# Patient Record
Sex: MALE | Race: WHITE | NOT HISPANIC OR LATINO | Employment: OTHER | URBAN - METROPOLITAN AREA
[De-identification: names, ages, dates, MRNs, and addresses within clinical notes are randomized per-mention and may not be internally consistent; named-entity substitution may affect disease eponyms.]

---

## 2018-05-31 ENCOUNTER — HOSPITAL ENCOUNTER (OUTPATIENT)
Dept: ULTRASOUND IMAGING | Facility: CLINIC | Age: 68
End: 2018-05-31
Attending: FAMILY MEDICINE
Payer: MEDICARE

## 2018-05-31 ENCOUNTER — HOSPITAL ENCOUNTER (OUTPATIENT)
Dept: ULTRASOUND IMAGING | Facility: CLINIC | Age: 68
Discharge: HOME | End: 2018-05-31
Attending: FAMILY MEDICINE | Admitting: FAMILY MEDICINE
Payer: MEDICARE

## 2018-05-31 DIAGNOSIS — L03.115 CELLULITIS OF RIGHT LEG: ICD-10-CM

## 2018-05-31 DIAGNOSIS — R60.0 LOCALIZED EDEMA: ICD-10-CM

## 2018-05-31 DIAGNOSIS — R09.89 DIMINISHED PULSES IN LOWER EXTREMITY: ICD-10-CM

## 2018-05-31 PROCEDURE — 93971 EXTREMITY STUDY: CPT | Mod: RT

## 2018-05-31 PROCEDURE — 93922 UPR/L XTREMITY ART 2 LEVELS: CPT

## 2019-02-23 ENCOUNTER — HOSPITAL ENCOUNTER (OUTPATIENT)
Facility: CLINIC | Age: 69
Setting detail: OBSERVATION
Discharge: HOME | End: 2019-02-24
Attending: FAMILY MEDICINE | Admitting: INTERNAL MEDICINE
Payer: MEDICARE

## 2019-02-23 ENCOUNTER — APPOINTMENT (OUTPATIENT)
Dept: GENERAL RADIOLOGY | Facility: CLINIC | Age: 69
End: 2019-02-23
Attending: FAMILY MEDICINE
Payer: MEDICARE

## 2019-02-23 DIAGNOSIS — J11.1 INFLUENZA-LIKE SYNDROME: ICD-10-CM

## 2019-02-23 DIAGNOSIS — I95.9 HYPOTENSION, UNSPECIFIED HYPOTENSION TYPE: ICD-10-CM

## 2019-02-23 DIAGNOSIS — J11.1 INFLUENZA-LIKE ILLNESS: ICD-10-CM

## 2019-02-23 DIAGNOSIS — Z79.899 LONG TERM USE OF DRUG: ICD-10-CM

## 2019-02-23 DIAGNOSIS — F17.210 CIGARETTE SMOKER: ICD-10-CM

## 2019-02-23 DIAGNOSIS — D75.89 MACROCYTOSIS WITHOUT ANEMIA: ICD-10-CM

## 2019-02-23 LAB
ALBUMIN SERPL-MCNC: 3.5 G/DL (ref 3.4–5)
ALBUMIN UR-MCNC: NEGATIVE MG/DL
ALP SERPL-CCNC: 51 U/L (ref 40–150)
ALT SERPL W P-5'-P-CCNC: 24 U/L (ref 0–70)
ANION GAP SERPL CALCULATED.3IONS-SCNC: 5 MMOL/L (ref 3–14)
APPEARANCE UR: CLEAR
AST SERPL W P-5'-P-CCNC: 28 U/L (ref 0–45)
BASOPHILS # BLD AUTO: 0.1 10E9/L (ref 0–0.2)
BASOPHILS NFR BLD AUTO: 1.8 %
BILIRUB SERPL-MCNC: 0.2 MG/DL (ref 0.2–1.3)
BILIRUB UR QL STRIP: NEGATIVE
BUN SERPL-MCNC: 21 MG/DL (ref 7–30)
CALCIUM SERPL-MCNC: 8.2 MG/DL (ref 8.5–10.1)
CHLORIDE SERPL-SCNC: 100 MMOL/L (ref 94–109)
CO2 SERPL-SCNC: 28 MMOL/L (ref 20–32)
COLOR UR AUTO: YELLOW
CREAT SERPL-MCNC: 1.24 MG/DL (ref 0.66–1.25)
DIFFERENTIAL METHOD BLD: ABNORMAL
EOSINOPHIL # BLD AUTO: 0.1 10E9/L (ref 0–0.7)
EOSINOPHIL NFR BLD AUTO: 2.4 %
ERYTHROCYTE [DISTWIDTH] IN BLOOD BY AUTOMATED COUNT: 13.1 % (ref 10–15)
ETHANOL SERPL-MCNC: <0.01 G/DL
FLUAV+FLUBV AG SPEC QL: NEGATIVE
FLUAV+FLUBV AG SPEC QL: NEGATIVE
FLUAV+FLUBV RNA SPEC QL NAA+PROBE: NEGATIVE
FLUAV+FLUBV RNA SPEC QL NAA+PROBE: NEGATIVE
GFR SERPL CREATININE-BSD FRML MDRD: 59 ML/MIN/{1.73_M2}
GLUCOSE SERPL-MCNC: 119 MG/DL (ref 70–99)
GLUCOSE UR STRIP-MCNC: NEGATIVE MG/DL
HCT VFR BLD AUTO: 42.6 % (ref 40–53)
HEMOCCULT STL QL: NEGATIVE
HGB BLD-MCNC: 13.6 G/DL (ref 13.3–17.7)
HGB UR QL STRIP: NEGATIVE
IMM GRANULOCYTES # BLD: 0 10E9/L (ref 0–0.4)
IMM GRANULOCYTES NFR BLD: 0.6 %
INR PPP: 0.94 (ref 0.86–1.14)
INTERNAL QC OK POCT: YES
KETONES UR STRIP-MCNC: NEGATIVE MG/DL
LACTATE BLD-SCNC: 1.9 MMOL/L (ref 0.7–2)
LEUKOCYTE ESTERASE UR QL STRIP: NEGATIVE
LYMPHOCYTES # BLD AUTO: 1.1 10E9/L (ref 0.8–5.3)
LYMPHOCYTES NFR BLD AUTO: 22.4 %
MCH RBC QN AUTO: 34.1 PG (ref 26.5–33)
MCHC RBC AUTO-ENTMCNC: 31.9 G/DL (ref 31.5–36.5)
MCV RBC AUTO: 107 FL (ref 78–100)
MONOCYTES # BLD AUTO: 0.6 10E9/L (ref 0–1.3)
MONOCYTES NFR BLD AUTO: 11.7 %
NEUTROPHILS # BLD AUTO: 3 10E9/L (ref 1.6–8.3)
NEUTROPHILS NFR BLD AUTO: 61.1 %
NITRATE UR QL: NEGATIVE
NRBC # BLD AUTO: 0 10*3/UL
NRBC BLD AUTO-RTO: 0 /100
NT-PROBNP SERPL-MCNC: 95 PG/ML (ref 0–900)
PH UR STRIP: 5 PH (ref 5–7)
PLATELET # BLD AUTO: 199 10E9/L (ref 150–450)
POTASSIUM SERPL-SCNC: 3.6 MMOL/L (ref 3.4–5.3)
PROT SERPL-MCNC: 7.4 G/DL (ref 6.8–8.8)
RBC # BLD AUTO: 3.99 10E12/L (ref 4.4–5.9)
RETICS # AUTO: 36.4 10E9/L (ref 25–95)
RETICS/RBC NFR AUTO: 0.9 % (ref 0.5–2)
RSV RNA SPEC NAA+PROBE: NEGATIVE
SODIUM SERPL-SCNC: 133 MMOL/L (ref 133–144)
SOURCE: NORMAL
SP GR UR STRIP: 1.01 (ref 1–1.03)
SPECIMEN SOURCE: NORMAL
SPECIMEN SOURCE: NORMAL
TEST CARD LOT NUMBER: NORMAL
TROPONIN I SERPL-MCNC: <0.015 UG/L (ref 0–0.04)
TSH SERPL DL<=0.005 MIU/L-ACNC: 3.97 MU/L (ref 0.4–4)
UROBILINOGEN UR STRIP-MCNC: 0 MG/DL (ref 0–2)
WBC # BLD AUTO: 5 10E9/L (ref 4–11)

## 2019-02-23 PROCEDURE — 99220 ZZC INITIAL OBSERVATION CARE,LEVL III: CPT | Performed by: INTERNAL MEDICINE

## 2019-02-23 PROCEDURE — 99285 EMERGENCY DEPT VISIT HI MDM: CPT | Mod: 25 | Performed by: FAMILY MEDICINE

## 2019-02-23 PROCEDURE — 80320 DRUG SCREEN QUANTALCOHOLS: CPT | Performed by: FAMILY MEDICINE

## 2019-02-23 PROCEDURE — 99291 CRITICAL CARE FIRST HOUR: CPT | Mod: 25 | Performed by: FAMILY MEDICINE

## 2019-02-23 PROCEDURE — 85025 COMPLETE CBC W/AUTO DIFF WBC: CPT | Performed by: FAMILY MEDICINE

## 2019-02-23 PROCEDURE — 87040 BLOOD CULTURE FOR BACTERIA: CPT | Mod: 91 | Performed by: FAMILY MEDICINE

## 2019-02-23 PROCEDURE — 84484 ASSAY OF TROPONIN QUANT: CPT | Performed by: FAMILY MEDICINE

## 2019-02-23 PROCEDURE — 85045 AUTOMATED RETICULOCYTE COUNT: CPT | Performed by: INTERNAL MEDICINE

## 2019-02-23 PROCEDURE — 93010 ELECTROCARDIOGRAM REPORT: CPT | Mod: Z6 | Performed by: FAMILY MEDICINE

## 2019-02-23 PROCEDURE — 87804 INFLUENZA ASSAY W/OPTIC: CPT | Mod: 91 | Performed by: FAMILY MEDICINE

## 2019-02-23 PROCEDURE — 25800030 ZZH RX IP 258 OP 636: Performed by: FAMILY MEDICINE

## 2019-02-23 PROCEDURE — 25000132 ZZH RX MED GY IP 250 OP 250 PS 637: Mod: GY | Performed by: FAMILY MEDICINE

## 2019-02-23 PROCEDURE — 93005 ELECTROCARDIOGRAM TRACING: CPT | Performed by: FAMILY MEDICINE

## 2019-02-23 PROCEDURE — 25000132 ZZH RX MED GY IP 250 OP 250 PS 637: Mod: GY | Performed by: INTERNAL MEDICINE

## 2019-02-23 PROCEDURE — 83605 ASSAY OF LACTIC ACID: CPT | Performed by: FAMILY MEDICINE

## 2019-02-23 PROCEDURE — 82272 OCCULT BLD FECES 1-3 TESTS: CPT | Performed by: FAMILY MEDICINE

## 2019-02-23 PROCEDURE — 84443 ASSAY THYROID STIM HORMONE: CPT | Performed by: INTERNAL MEDICINE

## 2019-02-23 PROCEDURE — 96360 HYDRATION IV INFUSION INIT: CPT | Performed by: FAMILY MEDICINE

## 2019-02-23 PROCEDURE — 87086 URINE CULTURE/COLONY COUNT: CPT | Performed by: FAMILY MEDICINE

## 2019-02-23 PROCEDURE — 85610 PROTHROMBIN TIME: CPT | Performed by: FAMILY MEDICINE

## 2019-02-23 PROCEDURE — 87631 RESP VIRUS 3-5 TARGETS: CPT | Performed by: FAMILY MEDICINE

## 2019-02-23 PROCEDURE — G0378 HOSPITAL OBSERVATION PER HR: HCPCS

## 2019-02-23 PROCEDURE — 80053 COMPREHEN METABOLIC PANEL: CPT | Performed by: FAMILY MEDICINE

## 2019-02-23 PROCEDURE — A9270 NON-COVERED ITEM OR SERVICE: HCPCS | Mod: GY | Performed by: FAMILY MEDICINE

## 2019-02-23 PROCEDURE — 83880 ASSAY OF NATRIURETIC PEPTIDE: CPT | Performed by: FAMILY MEDICINE

## 2019-02-23 PROCEDURE — 82607 VITAMIN B-12: CPT | Performed by: INTERNAL MEDICINE

## 2019-02-23 PROCEDURE — 81003 URINALYSIS AUTO W/O SCOPE: CPT | Performed by: FAMILY MEDICINE

## 2019-02-23 PROCEDURE — 71046 X-RAY EXAM CHEST 2 VIEWS: CPT

## 2019-02-23 PROCEDURE — A9270 NON-COVERED ITEM OR SERVICE: HCPCS | Mod: GY | Performed by: INTERNAL MEDICINE

## 2019-02-23 PROCEDURE — 96361 HYDRATE IV INFUSION ADD-ON: CPT | Performed by: FAMILY MEDICINE

## 2019-02-23 RX ORDER — VENLAFAXINE 100 MG/1
200 TABLET ORAL EVERY MORNING
COMMUNITY

## 2019-02-23 RX ORDER — ONDANSETRON 4 MG/1
4 TABLET, ORALLY DISINTEGRATING ORAL EVERY 6 HOURS PRN
Status: DISCONTINUED | OUTPATIENT
Start: 2019-02-23 | End: 2019-02-24 | Stop reason: HOSPADM

## 2019-02-23 RX ORDER — DEXTROSE MONOHYDRATE, SODIUM CHLORIDE, AND POTASSIUM CHLORIDE 50; 1.49; 4.5 G/1000ML; G/1000ML; G/1000ML
INJECTION, SOLUTION INTRAVENOUS CONTINUOUS
Status: DISCONTINUED | OUTPATIENT
Start: 2019-02-23 | End: 2019-02-23

## 2019-02-23 RX ORDER — ACETAMINOPHEN 650 MG/1
650 SUPPOSITORY RECTAL EVERY 4 HOURS PRN
Status: DISCONTINUED | OUTPATIENT
Start: 2019-02-23 | End: 2019-02-24 | Stop reason: HOSPADM

## 2019-02-23 RX ORDER — ONDANSETRON 2 MG/ML
4 INJECTION INTRAMUSCULAR; INTRAVENOUS EVERY 6 HOURS PRN
Status: DISCONTINUED | OUTPATIENT
Start: 2019-02-23 | End: 2019-02-24 | Stop reason: HOSPADM

## 2019-02-23 RX ORDER — NALOXONE HYDROCHLORIDE 0.4 MG/ML
.1-.4 INJECTION, SOLUTION INTRAMUSCULAR; INTRAVENOUS; SUBCUTANEOUS
Status: DISCONTINUED | OUTPATIENT
Start: 2019-02-23 | End: 2019-02-24 | Stop reason: HOSPADM

## 2019-02-23 RX ORDER — ACETAMINOPHEN 325 MG/1
650 TABLET ORAL EVERY 4 HOURS PRN
Status: DISCONTINUED | OUTPATIENT
Start: 2019-02-23 | End: 2019-02-24 | Stop reason: HOSPADM

## 2019-02-23 RX ORDER — NICOTINE 21 MG/24HR
1 PATCH, TRANSDERMAL 24 HOURS TRANSDERMAL DAILY
Status: DISCONTINUED | OUTPATIENT
Start: 2019-02-23 | End: 2019-02-24 | Stop reason: HOSPADM

## 2019-02-23 RX ORDER — BENZOCAINE/MENTHOL 6 MG-10 MG
LOZENGE MUCOUS MEMBRANE 2 TIMES DAILY
Status: DISCONTINUED | OUTPATIENT
Start: 2019-02-23 | End: 2019-02-24 | Stop reason: HOSPADM

## 2019-02-23 RX ORDER — OSELTAMIVIR PHOSPHATE 75 MG/1
75 CAPSULE ORAL 2 TIMES DAILY
Status: DISCONTINUED | OUTPATIENT
Start: 2019-02-23 | End: 2019-02-24

## 2019-02-23 RX ORDER — VENLAFAXINE 100 MG/1
200 TABLET ORAL EVERY MORNING
Status: DISCONTINUED | OUTPATIENT
Start: 2019-02-24 | End: 2019-02-24 | Stop reason: HOSPADM

## 2019-02-23 RX ADMIN — HYDROCORTISONE: 10 CREAM TOPICAL at 20:45

## 2019-02-23 RX ADMIN — SODIUM CHLORIDE, POTASSIUM CHLORIDE, SODIUM LACTATE AND CALCIUM CHLORIDE 1000 ML: 600; 310; 30; 20 INJECTION, SOLUTION INTRAVENOUS at 15:24

## 2019-02-23 RX ADMIN — POTASSIUM CHLORIDE, DEXTROSE MONOHYDRATE AND SODIUM CHLORIDE: 150; 5; 450 INJECTION, SOLUTION INTRAVENOUS at 19:37

## 2019-02-23 RX ADMIN — CARBAMIDE PEROXIDE 6.5% 3 DROP: 6.5 LIQUID AURICULAR (OTIC) at 20:45

## 2019-02-23 RX ADMIN — OSELTAMIVIR PHOSPHATE 75 MG: 75 CAPSULE ORAL at 15:31

## 2019-02-23 RX ADMIN — SODIUM CHLORIDE, POTASSIUM CHLORIDE, SODIUM LACTATE AND CALCIUM CHLORIDE 1000 ML: 600; 310; 30; 20 INJECTION, SOLUTION INTRAVENOUS at 14:27

## 2019-02-23 RX ADMIN — NICOTINE 1 PATCH: 21 PATCH, EXTENDED RELEASE TRANSDERMAL at 20:44

## 2019-02-23 ASSESSMENT — MIFFLIN-ST. JEOR
SCORE: 1455.51
SCORE: 1450.25

## 2019-02-23 NOTE — ED NOTES
"Cough for past 3 days, chills. Diarrhea x3 today. Denies fever. Appetite \"ok.\"   Smoker pack a day.   "

## 2019-02-23 NOTE — ED NOTES
Patient has  [place] to Observation  order. Patient has been given Observation brochure and  What does Observation mean to me  form.        Tita Menjivar

## 2019-02-23 NOTE — ED PROVIDER NOTES
"  History     Chief Complaint   Patient presents with     Hypotension     body aches-possible fever-weak and light headed and cough     HPI    Julissa Almazan is a 68 year old male with a history of hypertension, and chronic airway obstruction who presents to the emergency department today for evaluation of hypotension. Patient developed flu like symptoms three days ago that included cough, fever, chills, myalgias, and fatigue. Patient notes increased urinary frequency on the first day that has since stopped, and three episodes of diarrhea today. He denies any shortness of breath, dyspnea, abdominal pain, chest pain, dysuria, black tarry stoles, or blood in his stool. Patient smokes a pack a day and drinks 3 or 4 beers a day. He denies drinking anything today. His daily medications include lisinopril and venlafaxine.  He says he is not taking the other medications listed below.  He does not believe he drinks excessively.    Records through Care Everywhere are as follows:  \"Allergies  No Known Allergies  Current Medications  Reconcile with Patient's Chart    Prescription Sig. Disp. Refills Start Date End Date Status   aspirin enteric coated 81 mg tablet   Take 1 tablet by mouth once daily with a meal.   0 04/08/2011   Active   cyclobenzaprine (FLEXERIL) 10 mg tablet    Indications: Lumbar disc disease TAKE 1 TABLET BY MOUTH 3 TIMES DAILY IF NEEDED FOR MUSCLE SPASM 60 tablet   2 01/26/2017   Active   diclofenac (VOLTAREN) 75 mg delayed-release tablet    Indications: Lumbar disc disease Take 1 tablet by mouth 2 times daily if needed for Other (Specify). 60 tablet   2 01/24/2018   Active   sildenafil citrate (VIAGRA) 100 mg tablet    Indications: Erectile dysfunction due to arterial insufficiency Take 1 tablet by mouth once daily if needed for Erectile Dysfunction. Take 30min to 4 hours before sexual activity. Max 100mg/24hr. 10 tablet   11 01/24/2018   Active   venlafaxine (EFFEXOR) 100 mg tablet    Indications: " Adjustment disorder with depressed mood Take 2 tablets by mouth every morning. 180 tablet   3 2018   Active   lisinopril (PRINIVIL; ZESTRIL) 20 mg tablet    Indications: Hypertension Take 1 tablet by mouth once daily. 90 tablet   3 2018   Active   terbinafine HCl (LAMISIL) 250 mg tablet    Indications: Fungal infection of skin Take 1 tablet by mouth once daily. 14 tablet   0 2018   Active   varenicline (CHANTIX DOSEPAK) 0.5 mg (11)- 1 mg (42) tablet    Indications: Tobacco use disorder Days 1-3 take 0.5mg once daily; Days 4-7 take 0.5mg twice daily; then increase to 1mg twice daily. Take with meals. 1 Packet   0 2018   Active   varenicline (CHANTIX) 1 mg tablet    Indications: Tobacco use disorder Take 1 mg by mouth 2 times daily with meals. 112 tablet   0 2018   Active   triamcinolone (ARISTOCORT; KENALOG) 0.1 % cream    Indications: Dermatitis Apply topically to affected area(s) 3 times daily. 80 g   0 2018   Active     Active Problems  Reconcile with Patient's Chart    Problem Noted Date   Hypertension 2011   Lumbar disc disease 2011   Overview:       7/15/2005 MRI Lumbar Spine. Broad based posterior disc protrusion at L4-5 is unchanged. Right for lateral disc protrusion at L4-5 abutting the right L4 is new. Mild degenerative retrolisthesis of L2 on L3 and L3 on L4 with shallow posterior disc protrusion at these levels are unchanged.  2005 X-ray No compression fracture. 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       Past Medical History:    Past Medical History:   Diagnosis Date     Hypertension        Past Surgical History:    Past Surgical History:   Procedure Laterality Date     BACK SURGERY       COLONOSCOPY  11/21/2013    Procedure: COLONOSCOPY;  Colonoscopy  ;  Surgeon: Quincy Meredith MD;  Location: [facility]       Family History:    No family history on file.    Social History:  Marital Status:   [4]  Social History     Tobacco Use     Smoking status: Current Every Day Smoker     Packs/day: 1.00     Types: Cigarettes   Substance Use Topics     Alcohol use: Yes     Comment: 2 daily     Drug use: No        Medications:      aspirin (ASA) 81 MG tablet   LISINOPRIL PO   Pseudoephedrine-DM-GG-APAP (DAYQUIL LIQUICAPS OR)   venlafaxine (EFFEXOR) 100 MG tablet   diclofenac (VOLTAREN) 75 MG EC tablet   sildenafil (VIAGRA) 100 MG tablet     Review of Systems    All other systems are reviewed and are negative    Physical Exam   BP: (!) 67/49  Pulse: 66  Heart Rate: 76  Temp: 97.6  F (36.4  C)  Resp: 18  Height: 179.1 cm (5' 10.5\")  Weight: 67.1 kg (148 lb)  SpO2: 97 %      Physical Exam    Nursing note and vitals were reviewed.  Constitutional: Awake and alert, poorly nourished and chronically ill appearing 68-year-old in no apparent discomfort, who does not appear acutely ill, and who answers questions appropriately and cooperates with examination.  HEENT: EACs clear.  TMs normal.  Oropharynx shows poor dentition is otherwise unremarkable EOMI.   Neck: Freely mobile.  No adenopathy or masses.  No meningismus.  Cardiovascular: Cardiac examination reveals normal heart rate and regular rhythm without murmur.  Pulmonary/Chest: Breathing is unlabored.  Breath sounds are clear and equal bilaterally.  There no retractions, tachypnea, rales, wheezes, or rhonchi.  Abdomen: Soft, nontender, no HSM or masses rebound or guarding.  Musculoskeletal: Extremities are " warm and well-perfused and without edema  Neurological: Alert, oriented, thought content logical, coherent   Skin: Warm, dry, no rashes.  Psychiatric: Affect broad and appropriate.    ED Course   2:14 Patient assessed. Course of care outlined.      Procedures               EKG Interpretation:      Interpreted by Scott Castle  Time reviewed: 14:27  Symptoms at time of EKG: weakness; hypotension   Rhythm: normal sinus   Rate: normal  Axis: normal  Ectopy: none  Conduction: normal  ST Segments/ T Waves: ST elevation in V2 and V3 consistent with early repolarization  Q Waves: none  Comparison to prior: No old EKG available    Clinical Impression: Early repolarization, otherwise normal EKG    Critical Care time:  was 30 minutes for this patient excluding procedures.               Results for orders placed or performed during the hospital encounter of 02/23/19 (from the past 24 hour(s))   Occult blood stool POCT   Result Value Ref Range    Occult Blood NEGATIVE neg    Internal QC OK Yes     Test Card Lot Number [id number] 08-21    CBC with platelets differential   Result Value Ref Range    WBC 5.0 4.0 - 11.0 10e9/L    RBC Count 3.99 (L) 4.4 - 5.9 10e12/L    Hemoglobin 13.6 13.3 - 17.7 g/dL    Hematocrit 42.6 40.0 - 53.0 %     (H) 78 - 100 fl    MCH 34.1 (H) 26.5 - 33.0 pg    MCHC 31.9 31.5 - 36.5 g/dL    RDW 13.1 10.0 - 15.0 %    Platelet Count 199 150 - 450 10e9/L    Diff Method Automated Method     % Neutrophils 61.1 %    % Lymphocytes 22.4 %    % Monocytes 11.7 %    % Eosinophils 2.4 %    % Basophils 1.8 %    % Immature Granulocytes 0.6 %    Nucleated RBCs 0 0 /100    Absolute Neutrophil 3.0 1.6 - 8.3 10e9/L    Absolute Lymphocytes 1.1 0.8 - 5.3 10e9/L    Absolute Monocytes 0.6 0.0 - 1.3 10e9/L    Absolute Eosinophils 0.1 0.0 - 0.7 10e9/L    Absolute Basophils 0.1 0.0 - 0.2 10e9/L    Abs Immature Granulocytes 0.0 0 - 0.4 10e9/L    Absolute Nucleated RBC 0.0    INR   Result Value Ref Range    INR 0.94 0.86 - 1.14    Comprehensive metabolic panel   Result Value Ref Range    Sodium 133 133 - 144 mmol/L    Potassium 3.6 3.4 - 5.3 mmol/L    Chloride 100 94 - 109 mmol/L    Carbon Dioxide 28 20 - 32 mmol/L    Anion Gap 5 3 - 14 mmol/L    Glucose 119 (H) 70 - 99 mg/dL    Urea Nitrogen 21 7 - 30 mg/dL    Creatinine 1.24 0.66 - 1.25 mg/dL    GFR Estimate 59 (L) >60 mL/min/[1.73_m2]    GFR Estimate If Black 68 >60 mL/min/[1.73_m2]    Calcium 8.2 (L) 8.5 - 10.1 mg/dL    Bilirubin Total 0.2 0.2 - 1.3 mg/dL    Albumin 3.5 3.4 - 5.0 g/dL    Protein Total 7.4 6.8 - 8.8 g/dL    Alkaline Phosphatase 51 40 - 150 U/L    ALT 24 0 - 70 U/L    AST 28 0 - 45 U/L   Lactic acid whole blood   Result Value Ref Range    Lactic Acid 1.9 0.7 - 2.0 mmol/L   Troponin I   Result Value Ref Range    Troponin I ES <0.015 0.000 - 0.045 ug/L   Nt probnp inpatient (BNP)   Result Value Ref Range    N-Terminal Pro BNP Inpatient 95 0 - 900 pg/mL   Blood culture   Result Value Ref Range    Specimen Description Blood     Culture Micro No growth after 1 hour    Alcohol ethyl   Result Value Ref Range    Ethanol g/dL <0.01 <0.01 g/dL   Influenza A/B antigen   Result Value Ref Range    Influenza A/B Agn Specimen Nasopharyngeal     Influenza A Negative NEG^Negative    Influenza B Negative NEG^Negative   XR Chest 2 Views    Narrative    CHEST TWO VIEWS 2/23/2019 2:48 PM     HISTORY: Cough, fever.     COMPARISON: None available    FINDINGS: Slight aortic tortuosity.      Impression    IMPRESSION:   No acute consolidation.   UA reflex to Microscopic   Result Value Ref Range    Color Urine Yellow     Appearance Urine Clear     Glucose Urine Negative NEG^Negative mg/dL    Bilirubin Urine Negative NEG^Negative    Ketones Urine Negative NEG^Negative mg/dL    Specific Gravity Urine 1.012 1.003 - 1.035    Blood Urine Negative NEG^Negative    pH Urine 5.0 5.0 - 7.0 pH    Protein Albumin Urine Negative NEG^Negative mg/dL    Urobilinogen mg/dL 0.0 0.0 - 2.0 mg/dL    Nitrite Urine  Negative NEG^Negative    Leukocyte Esterase Urine Negative NEG^Negative    Source Midstream Urine        Medications   oseltamivir (TAMIFLU) capsule 75 mg ( Oral Canceled Entry 2/23/19 1730)   lactated ringers BOLUS 1,000 mL (0 mLs Intravenous Stopped 2/23/19 1517)   lactated ringers BOLUS 1,000 mL (0 mLs Intravenous Stopped 2/23/19 1809)       Assessments & Plan (with Medical Decision Making)     68-year-old male presented with flulike symptoms of myalgias, cough, fevers.  On arrival he was found to be hypotensive with blood pressure 67 systolic.  He underwent emergent vascular access and hydration with Ringer's lactate.  After 1 L of Ringer's lactate blood pressure improved to 96/64.  He received a second liter blood pressure further improved to normal.  Workup in the emergency department showed a normal CBC with the exception of red blood cell macrocytosis.  I asked him about his alcohol use with concern for excessive drinking as a cause for this.  Recommended further workup with thiamine and folate levels and peripheral blood smear.  This can be done inpatient.  The cause for his hypotension is unclear.  He does not appear to be septic.  He has no fever on exam here, no leukocytosis, no other lower organ dysfunction, and a normal lactate.  There is no evidence of cardiogenic shock with a normal BNP, troponin, EKG.  There is also no evidence for pneumonia on his laboratory studies and on his chest x-ray though an occult pneumonia is still possible with a normal chest x-ray.  Chest x-ray does show some evidence of emphysema and I encouraged him to quit smoking.  I suspect this is a combination of a flulike illness with poor oral intake in the setting of chronic use of antihypertensive medication which she has been taking and baseline generally poor health with his smoking and alcohol use.  At the time of admission the only test pending his urine analysis.  If this shows evidence of UTI we will initiate antibiotic  therapy, but I have low suspicion for this.  Despite negative rapid flu test I initiated therapy with Tamiflu because of the flulike symptoms and ordered influenza PCR is a more sensitive test.  I discussed my recommendations on the test results with the patient and his family and they are agreeable to admission.  I discussed his case with Dr. Dimas of the hospital service, and they will assume care on admission.    I have reviewed the nursing notes.    I have reviewed the findings, diagnosis, plan and need for follow up with the patient.          Medication List      There are no discharge medications for this visit.         Final diagnoses:   Hypotension, unspecified hypotension type   Influenza-like illness   Macrocytosis without anemia     This document serves as a record of the services and decisions personally performed and made by Scott Castle MD. It was created on HIS/HER behalf by Ashlyn Mcelroy, a trained medical scribe. The creation of this document is based on the provider's statements to the medical scribe.  Ashlyn Mcelroy 2:19 PM 2/23/2019    Provider:  The information in this document, created by the medical scribe for me, accurately reflects the services I personally performed and the decisions made by me. I have reviewed and approved this document for accuracy prior to leaving the patient care area.  Scott Castle MD 2:19 PM 2/23/2019 2/23/2019   [facility] EMERGENCY DEPARTMENT     Scott Castle MD  02/23/19 0702       Scott Castle MD  02/23/19 [id number]

## 2019-02-24 VITALS
TEMPERATURE: 97.5 F | BODY MASS INDEX: 21.27 KG/M2 | DIASTOLIC BLOOD PRESSURE: 70 MMHG | WEIGHT: 148.59 LBS | HEIGHT: 70 IN | SYSTOLIC BLOOD PRESSURE: 124 MMHG | RESPIRATION RATE: 18 BRPM | HEART RATE: 62 BPM | OXYGEN SATURATION: 100 %

## 2019-02-24 LAB
ANION GAP SERPL CALCULATED.3IONS-SCNC: 4 MMOL/L (ref 3–14)
BACTERIA SPEC CULT: NO GROWTH
BUN SERPL-MCNC: 15 MG/DL (ref 7–30)
CALCIUM SERPL-MCNC: 8 MG/DL (ref 8.5–10.1)
CHLORIDE SERPL-SCNC: 104 MMOL/L (ref 94–109)
CO2 SERPL-SCNC: 28 MMOL/L (ref 20–32)
CREAT SERPL-MCNC: 0.9 MG/DL (ref 0.66–1.25)
FOLATE SERPL-MCNC: 15.6 NG/ML
GFR SERPL CREATININE-BSD FRML MDRD: 86 ML/MIN/{1.73_M2}
GLUCOSE SERPL-MCNC: 92 MG/DL (ref 70–99)
Lab: NORMAL
POTASSIUM SERPL-SCNC: 4.2 MMOL/L (ref 3.4–5.3)
SODIUM SERPL-SCNC: 136 MMOL/L (ref 133–144)
SPECIMEN SOURCE: NORMAL
VIT B12 SERPL-MCNC: 337 PG/ML (ref 193–986)

## 2019-02-24 PROCEDURE — 99217 ZZC OBSERVATION CARE DISCHARGE: CPT | Performed by: FAMILY MEDICINE

## 2019-02-24 PROCEDURE — A9270 NON-COVERED ITEM OR SERVICE: HCPCS | Mod: GY | Performed by: INTERNAL MEDICINE

## 2019-02-24 PROCEDURE — 99207 ZZC CDG-CODE CATEGORY CHANGED: CPT | Performed by: FAMILY MEDICINE

## 2019-02-24 PROCEDURE — 25000132 ZZH RX MED GY IP 250 OP 250 PS 637: Mod: GY | Performed by: FAMILY MEDICINE

## 2019-02-24 PROCEDURE — 82746 ASSAY OF FOLIC ACID SERUM: CPT | Performed by: INTERNAL MEDICINE

## 2019-02-24 PROCEDURE — 36415 COLL VENOUS BLD VENIPUNCTURE: CPT | Performed by: INTERNAL MEDICINE

## 2019-02-24 PROCEDURE — 80048 BASIC METABOLIC PNL TOTAL CA: CPT | Performed by: INTERNAL MEDICINE

## 2019-02-24 PROCEDURE — G0378 HOSPITAL OBSERVATION PER HR: HCPCS

## 2019-02-24 PROCEDURE — 25000132 ZZH RX MED GY IP 250 OP 250 PS 637: Mod: GY | Performed by: INTERNAL MEDICINE

## 2019-02-24 PROCEDURE — A9270 NON-COVERED ITEM OR SERVICE: HCPCS | Mod: GY | Performed by: FAMILY MEDICINE

## 2019-02-24 RX ADMIN — VENLAFAXINE 200 MG: 100 TABLET ORAL at 09:50

## 2019-02-24 RX ADMIN — CARBAMIDE PEROXIDE 6.5% 3 DROP: 6.5 LIQUID AURICULAR (OTIC) at 09:50

## 2019-02-24 RX ADMIN — NICOTINE 1 PATCH: 21 PATCH, EXTENDED RELEASE TRANSDERMAL at 08:22

## 2019-02-24 RX ADMIN — HYDROCORTISONE: 10 CREAM TOPICAL at 09:51

## 2019-02-24 NOTE — PLAN OF CARE
[facility] DISCHARGE NOTE    Patient tolerated >50% of regular diet, drinking well and ambulating steadily in halls.  Patient discharged to home at 10:10 AM via wheel chair. Accompanied by daughter and staff. Discharge instructions reviewed with patient and daughter, opportunity offered to ask questions. Prescriptions - None ordered for discharge. All belongings sent with patient.    Corinne Molina

## 2019-02-24 NOTE — PLAN OF CARE
Thin, quiet man. Pt does not look  like he takes care of himself.. Nutrition suggestions asked for.   No c/o discomfort or diarrhea tonight.  Pt has been sleeping on and off.  BIRDIE Abdullahi RN

## 2019-02-24 NOTE — DISCHARGE INSTRUCTIONS
Hold your lisinopril until seeing your primary doctor.  Push fluids.  Return to the ER if you have any significant problems--otherwise see your primary doctor within one week.

## 2019-02-24 NOTE — PLAN OF CARE
Patient reports that he has been dizzy for a couple weeks now. Patient's daughter wondering if patient should restart lisinpril at 1/2 dose. Updated Dr. Marquez. He will stop lisinpril until BP is rechecked in clinic at f/unit(s) appointment.

## 2019-02-24 NOTE — PLAN OF CARE
Vitals stable, patient has not been hypotensive since admission to room.  Patient c/o dizziness intermittently with movement. Patient up with stand by assist.  Patient has rash/dry skin in between bilateral eyebrows and bridge of nose, hydrocortisone cream applied per orders.  Patient does have scabbed areas d/t scratching and a few rash/dry areas on bilateral lower extremities.  Ear drops given, patient states he is hard of hearing d/t wax buildup in ears.  Patient denies any pain. No nausea, vomiting or diarrhea since admission.  Nicotine patch placed on right shoulder. Patient currently resting in between cares.

## 2019-02-24 NOTE — H&P
[facility]    History and Physical  Hospital Medicine       Date of Admission:  2/23/2019  Date of Service: 2/23/2019     Assessment & Plan   Julissa Almazan is a 68 year old male with hx htn , depression and tobacco abuse  who presents with  3 days of a flulike illness with dry cough and orthostatic dizziness and found to be hytpotensive in ed    1. Hypotension- resolved with ivf. Not clear what caused this as pt not clearly septic and had been eating though did have diarhhea.  ,Will admit overnight and follow bp, hold lisinopril, ivf. Creat 1.24 but dont know baseline. Recheck am    2. ID- possible influenza. CXR clear and influenza screen neg though influenza pcr pending. Started on empiric tamaflu by ed and will cont for now    3.Macrocytosis- unclear etiology. Could be related to etoh with hx of 0-6 beers per day but lft nl. Will check b12. Folate, tsh, retic count.    4. Tobacco abuse- wants to quit and has tried a lot of things which have not worked. Will order nicotine patch while here    5. Probable seborrheic dermatitis, face/ears- trial hydrocortisone cream    6. Cerumen impaction- debrox      DVT Prophylaxis:  Sequential, ambulate  Code Status:  Dnr/dni per discussion with patient today  Disposition: Anticipate discharge in 1-2 days, observation    Maki Dimas            Past Medical History      Past Medical History:   Diagnosis Date     Depression      Hypertension      Patient Active Problem List    Diagnosis Date Noted     Hypotension 02/23/2019     Priority: Medium        Past Surgical History     Past Surgical History:   Procedure Laterality Date     BACK SURGERY       COLONOSCOPY  11/21/2013    Procedure: COLONOSCOPY;  Colonoscopy  ;  Surgeon: Quincy Meredith MD;  Location: [facility]        History of Present Illness   Julissa Almazan is a 68 year old male with hx htn, cigarette smoking  who presents with  3 day hx of dry cough, fatigue,  Orthostatic dizziness, feeling  like he was freezing yesterday .  He said this am he had 3 episodes of diarhhea but has been able to eat and not nauseated. No sob or cp. He came to ed and his bp was 67/49. He received 3 liters of ivf and sbp now 117/73 and his orthostatic sx are gone. His workup so far is negative for any specific cause and he has been started empirically by ed on tamiflu awaiting influenza pcr    PTA meds-   Lisinopril  effexor  Aspirin prn    ( not taking other meds on drug list)      Allergies   No Known Allergies    Family History    Family History   Problem Relation Age of Onset     Heart Disease Father      Heart Disease Sister      Heart Disease Brother        Social History   Social History     Socioeconomic History     Marital status:      Spouse name: Not on file     Number of children: Not on file     Years of education: Not on file     Highest education level: Not on file   Occupational History     Not on file   Social Needs     Financial resource strain: Not on file     Food insecurity:     Worry: Not on file     Inability: Not on file     Transportation needs:     Medical: Not on file     Non-medical: Not on file   Tobacco Use     Smoking status: Current Every Day Smoker     Packs/day: 1.00     Types: Cigarettes   Substance and Sexual Activity     Alcohol use: Yes     Comment: 0-6 beers a day      Drug use: No     Sexual activity: Not on file   Lifestyle     Physical activity:     Days per week: Not on file     Minutes per session: Not on file     Stress: Not on file   Relationships     Social connections:     Talks on phone: Not on file     Gets together: Not on file     Attends Orthodoxy service: Not on file     Active member of club or organization: Not on file     Attends meetings of clubs or organizations: Not on file     Relationship status: Not on file     Intimate partner violence:     Fear of current or ex partner: Not on file     Emotionally abused: Not on file     Physically abused: Not on file      "Forced sexual activity: Not on file   Other Topics Concern     Parent/sibling w/ CABG, MI or angioplasty before 65F 55M? Not Asked   Social History Narrative     Not on file       Review of Systems   10 point ros pos for hpi and otherwise neg except: several weeks of ears feeling plugged    Physical Exam   /62   Pulse 61   Temp 97.5  F (36.4  C) (Oral)   Resp 18   Ht 1.778 m (5' 10\")   Wt 67.4 kg (148 lb 9.4 oz)   SpO2 97%   BMI 21.32 kg/m     Initial sbp in ed was 67. Got 3 liters of ivf and bp now as above  Weight: 148 lbs 9.44 oz Body mass index is 21.32 kg/m .     Constitutional: Alert, oriented, cooperative, no apparent distress, appears nontoxic  Eyes: Eyes are clear, pupils are reactive.  HEENT:  No evidence of cranial trauma.  Lymph/Hematologic: No  lymphadenopathy is appreciated.  Cardiovascular: Regular rate and rhythm, normal S1 and S2, and no murmur noted.    Respiratory: Clear to auscultation bilaterally.   GI: Soft, non-tender, normal bowel sounds, no hepatosplenomegaly.  Genitourinary: Deferred  Musculoskeletal: Normal muscle bulk and tone.  Skin: Warm and dry,  Dry scaly rash on erythematous skin-eyebrows and between eyebrows and nasolabial folds as well as pinna  Neurologic: Neck supple. Cranial nerves are grossly intact.     Data   Data reviewed today:   Recent Labs   Lab 02/23/19  1417   WBC 5.0   HGB 13.6   *      INR 0.94      POTASSIUM 3.6   CHLORIDE 100   CO2 28   BUN 21   CR 1.24   ANIONGAP 5   MAGUE 8.2*   *   ALBUMIN 3.5   PROTTOTAL 7.4   BILITOTAL 0.2   ALKPHOS 51   ALT 24   AST 28   TROPI <0.015   etoh neg  Lactate 1.9  Influenza screen neg  Inf;luenza pcr pending  Ur/a neg    Recent Results (from the past 24 hour(s))   XR Chest 2 Views    Narrative    CHEST TWO VIEWS 2/23/2019 2:48 PM     HISTORY: Cough, fever.     COMPARISON: None available    FINDINGS: Slight aortic tortuosity.      Impression    IMPRESSION:   No acute consolidation.       ekg no " acute changes    Maki Dimas

## 2019-02-24 NOTE — PROGRESS NOTES
"[facility] [place] ADMISSION NOTE    Patient admitted to room 2301 at approximately 1910 via cart from emergency room. Patient was accompanied by daughter.     Verbal SBAR report received from CHUCK Broderick prior to patient arrival.     Patient ambulated to bed with stand-by assist. Patient alert and oriented X 3. The patient is not having any pain. 0-10 Pain Scale: 4. Admission vital signs: Blood pressure 119/62, pulse 61, temperature 97.5  F (36.4  C), temperature source Oral, resp. rate 18, height 1.778 m (5' 10\"), weight 67.4 kg (148 lb 9.4 oz), SpO2 97 %. Patient and daughter were oriented to plan of care, call light, bed controls, tv, telephone, bathroom and visiting hours.     Risk Assessment    The following safety risks were identified during admission: fall. Yellow risk band applied: YES.     Skin Initial Assessment    This writer admitted this patient and completed a full skin assessment and Efrain score in the Adult PCS flowsheet. Appropriate interventions initiated as needed.     Secondary skin check completed by Karla WOODS .         Romelia Toussaint    "

## 2019-02-24 NOTE — DISCHARGE SUMMARY
Admit Date:     02/23/2019   Discharge Date:           HOSPITAL COURSE:  Julissa Almazan is a 68-year-old male with history of hypertension, depression and tobacco abuse, who presented with 3 days of flu-like illness with dry cough, some diarrhea, and then orthostatic dizziness.  On the day of admission, he felt very cold.  He had 3 episodes of diarrhea, but was able to eat.  He did not have shortness of breath or chest pain.  He came to the emergency room and was found to have a blood pressure initially of 67/49.      He received 3 liters of fluid.  His pressure resolved.  His orthostatic symptoms went away.  He had a negative rapid flu test and negative flu PCR.  Tamiflu had been started initially but was stopped.  He had a clear chest x-ray, normal lactic, white count, BNP, troponin, chest x-ray and EKG.  He did have a high MCV.  B12 was normal.  Folate is pending.  There is some reported alcohol use.  I had concern that this may be related to that.      I saw him on the day of discharge only.  He was alert, pleasant, oriented, urinating, walking, and eating.  His blood pressure was normal.  We are going to watch him until noon.  If he is stable, he will discharge.        ASSESSMENT:     1.  Hypotension:  Probably related to dehydration and viral illness, along with continued use of antihypertensive agent.   2.  Flu-like illness with negative influenza rapid test and PCR.   3.  Macrocytosis:  Etiology unclear.  B12 is normal.  Folate is pending.   4.  Tobacco abuse.   5.  Seborrheic dermatitis.   6.  Cerumen impactions:      PLAN:  We are going to watch the patient for the next 4-5 hours.  If he is ambulatory, eating, feels good, and his vital signs are stable, we are going to discharge him.  I am going to have him hold his lisinopril until seen by primary are. He should follow up with his primary doctor next week and return here if he has further problems.     Current Discharge Medication List      CONTINUE these  medications which have NOT CHANGED    Details   aspirin (ASA) 81 MG tablet Take 81 mg by mouth daily      Pseudoephedrine-DM-GG-APAP (DAYQUIL LIQUICAPS OR) Take  by mouth daily as needed.      venlafaxine (EFFEXOR) 100 MG tablet Take 200 mg by mouth every morning      diclofenac (VOLTAREN) 75 MG EC tablet Take 1 tablet by mouth 2 times daily as needed. Back pain      sildenafil (VIAGRA) 100 MG tablet Take 1 tablet by mouth daily as needed.         STOP taking these medications       LISINOPRIL PO Comments:   Reason for Stopping:             Unresulted Labs Ordered in the Past 30 Days of this Admission     Date and Time Order Name Status Description    2019 0000 Folate In process     2019 1416 Blood culture Preliminary     2019 1416 Blood culture Preliminary     2019 1416 Urine Culture Preliminary               TOTAL TIME SPENT:  Greater than 30 minutes spent on this.         JENNY BRUNNER MD             D: 2019   T: 2019   MT: SHONNA      Name:     VINOD HARLEY   MRN:      [id number]        Account:        [id number]   :      1950           Admit Date:     2019                                  Discharge Date:       Document: [id number]

## 2019-02-24 NOTE — PROGRESS NOTES
"[facility]      Subjective:  He feels well  No dizziness  No difficulty breathing  No cp  Walking  Eating  Urinating  Some cough    Review of Systems:  CONSTITUTIONAL: NEGATIVE for fever, chills, change in weight  INTEGUMENTARY/SKIN: NEGATIVE for worrisome rashes, moles or lesions  EYES: NEGATIVE for vision changes or irritation  ENT/MOUTH: NEGATIVE for ear, mouth and throat problems  RESP:sl cough  BREAST: NEGATIVE for masses, tenderness or discharge  CV: NEGATIVE for chest pain, palpitations or peripheral edema  GI: NEGATIVE for nausea, abdominal pain, heartburn, or change in bowel habits  : NEGATIVE for frequency, dysuria, or hematuria  MUSCULOSKELETAL: NEGATIVE for significant arthralgias or myalgia  NEURO: NEGATIVE for weakness, dizziness or paresthesias  ENDOCRINE: NEGATIVE for temperature intolerance, skin/hair changes  HEME: NEGATIVE for bleeding problems  PSYCHIATRIC: NEGATIVE for changes in mood or affect    Physical Exam:  Vitals Were Reviewed    Patient Vitals for the past 16 hrs:   BP Temp Temp src Pulse Heart Rate Resp SpO2 Height Weight   02/24/19 0732 124/70 97.5  F (36.4  C) Oral 62 62 18 100 % -- --   02/24/19 0430 113/57 97.6  F (36.4  C) Oral 63 -- 16 98 % -- --   02/23/19 2312 98/55 97.8  F (36.6  C) Oral 65 -- 16 94 % -- --   02/23/19 1914 119/62 97.5  F (36.4  C) Oral 61 -- 18 97 % 1.778 m (5' 10\") 67.4 kg (148 lb 9.4 oz)   02/23/19 1830 117/73 -- -- 56 -- -- 96 % -- --   02/23/19 1815 121/74 -- -- 59 -- -- 97 % -- --   02/23/19 1800 124/83 -- -- 64 -- -- 98 % -- --   02/23/19 1745 125/73 -- -- 59 -- -- 96 % -- --   02/23/19 1730 125/77 -- -- -- -- -- -- -- --   02/23/19 1715 116/74 -- -- 59 -- -- 94 % -- --   02/23/19 1700 114/71 -- -- 58 -- -- 95 % -- --   02/23/19 1645 113/75 -- -- 59 -- -- 94 % -- --   02/23/19 1630 107/84 -- -- 65 -- -- 99 % -- --   02/23/19 1615 102/68 -- -- 56 -- -- 94 % -- --   02/23/19 1600 104/66 -- -- 58 -- -- 95 % -- --   02/23/19 1545 " 102/62 -- -- 56 -- -- 98 % -- --       No intake or output data in the 24 hours ending 02/24/19 0740    GENERAL APPEARANCE: frail, pleasant  EYES: conjunctiva clear, eyes grossly normal  RESP: slight exp wheeze  CV: regular rate and rhythm, normal S1 S2, no S3 or S4 and no murmur, click or rub   ABDOMEN: soft, nontender, no HSM or masses and bowel sounds normal  MS: no clubbing, cyanosis; no edema  SKIN: clear without significant rashes or lesions  NEURO: Normal strength and tone, sensory exam grossly normal, mentation intact and speech normal    Lab:  Recent Labs   Lab Test 02/23/19  1417      POTASSIUM 3.6   CHLORIDE 100   CO2 28   ANIONGAP 5   *   BUN 21   CR 1.24   MAGUE 8.2*     CBC RESULTS:   Recent Labs   Lab Test 02/23/19  1417   WBC 5.0   RBC 3.99*   HGB 13.6   HCT 42.6          Results for orders placed or performed during the hospital encounter of 02/23/19 (from the past 24 hour(s))   CBC with platelets differential   Result Value Ref Range    WBC 5.0 4.0 - 11.0 10e9/L    RBC Count 3.99 (L) 4.4 - 5.9 10e12/L    Hemoglobin 13.6 13.3 - 17.7 g/dL    Hematocrit 42.6 40.0 - 53.0 %     (H) 78 - 100 fl    MCH 34.1 (H) 26.5 - 33.0 pg    MCHC 31.9 31.5 - 36.5 g/dL    RDW 13.1 10.0 - 15.0 %    Platelet Count 199 150 - 450 10e9/L    Diff Method Automated Method     % Neutrophils 61.1 %    % Lymphocytes 22.4 %    % Monocytes 11.7 %    % Eosinophils 2.4 %    % Basophils 1.8 %    % Immature Granulocytes 0.6 %    Nucleated RBCs 0 0 /100    Absolute Neutrophil 3.0 1.6 - 8.3 10e9/L    Absolute Lymphocytes 1.1 0.8 - 5.3 10e9/L    Absolute Monocytes 0.6 0.0 - 1.3 10e9/L    Absolute Eosinophils 0.1 0.0 - 0.7 10e9/L    Absolute Basophils 0.1 0.0 - 0.2 10e9/L    Abs Immature Granulocytes 0.0 0 - 0.4 10e9/L    Absolute Nucleated RBC 0.0    INR   Result Value Ref Range    INR 0.94 0.86 - 1.14   Comprehensive metabolic panel   Result Value Ref Range    Sodium 133 133 - 144 mmol/L    Potassium 3.6 3.4 -  5.3 mmol/L    Chloride 100 94 - 109 mmol/L    Carbon Dioxide 28 20 - 32 mmol/L    Anion Gap 5 3 - 14 mmol/L    Glucose 119 (H) 70 - 99 mg/dL    Urea Nitrogen 21 7 - 30 mg/dL    Creatinine 1.24 0.66 - 1.25 mg/dL    GFR Estimate 59 (L) >60 mL/min/[1.73_m2]    GFR Estimate If Black 68 >60 mL/min/[1.73_m2]    Calcium 8.2 (L) 8.5 - 10.1 mg/dL    Bilirubin Total 0.2 0.2 - 1.3 mg/dL    Albumin 3.5 3.4 - 5.0 g/dL    Protein Total 7.4 6.8 - 8.8 g/dL    Alkaline Phosphatase 51 40 - 150 U/L    ALT 24 0 - 70 U/L    AST 28 0 - 45 U/L   Lactic acid whole blood   Result Value Ref Range    Lactic Acid 1.9 0.7 - 2.0 mmol/L   Troponin I   Result Value Ref Range    Troponin I ES <0.015 0.000 - 0.045 ug/L   Nt probnp inpatient (BNP)   Result Value Ref Range    N-Terminal Pro BNP Inpatient 95 0 - 900 pg/mL   Blood culture   Result Value Ref Range    Specimen Description Blood Right Arm     Special Requests Aerobic and anaerobic bottles received     Culture Micro No growth after 12 hours    Alcohol ethyl   Result Value Ref Range    Ethanol g/dL <0.01 <0.01 g/dL   Vitamin B12   Result Value Ref Range    Vitamin B12 337 193 - 986 pg/mL   TSH   Result Value Ref Range    TSH 3.97 0.40 - 4.00 mU/L   Reticulocyte count   Result Value Ref Range    % Retic 0.9 0.5 - 2.0 %    Absolute Retic 36.4 25 - 95 10e9/L   Influenza A/B antigen   Result Value Ref Range    Influenza A/B Agn Specimen Nasopharyngeal     Influenza A Negative NEG^Negative    Influenza B Negative NEG^Negative   Influenza A and B and RSV PCR   Result Value Ref Range    Specimen Description Nasopharyngeal     Influenza A PCR Negative NEG^Negative    Influenza B PCR Negative NEG^Negative    Resp Syncytial Virus Negative NEG^Negative   Blood culture   Result Value Ref Range    Specimen Description Blood Left Arm     Special Requests Aerobic and anaerobic bottles received     Culture Micro No growth after 12 hours    XR Chest 2 Views    Narrative    CHEST TWO VIEWS 2/23/2019 2:48 PM      HISTORY: Cough, fever.     COMPARISON: None available    FINDINGS: Slight aortic tortuosity.      Impression    IMPRESSION:   No acute consolidation.   UA reflex to Microscopic   Result Value Ref Range    Color Urine Yellow     Appearance Urine Clear     Glucose Urine Negative NEG^Negative mg/dL    Bilirubin Urine Negative NEG^Negative    Ketones Urine Negative NEG^Negative mg/dL    Specific Gravity Urine 1.012 1.003 - 1.035    Blood Urine Negative NEG^Negative    pH Urine 5.0 5.0 - 7.0 pH    Protein Albumin Urine Negative NEG^Negative mg/dL    Urobilinogen mg/dL 0.0 0.0 - 2.0 mg/dL    Nitrite Urine Negative NEG^Negative    Leukocyte Esterase Urine Negative NEG^Negative    Source Midstream Urine    Urine Culture   Result Value Ref Range    Specimen Description Midstream Urine     Special Requests Specimen received in preservative     Culture Micro PENDING        Assessment and Plan:    Julissa Almazan is a 68 year old male with hx htn , depression and tobacco abuse  who presents with  3 days of a flulike illness with dry cough and orthostatic dizziness and found to be hytpotensive in ed     1. Hypotension- resolved with ivf. Not clear what caused this as pt not clearly septic and had been eating though did have diarhhea.  ,Will admit overnight and follow bp, hold lisinopril, ivf. Creat 1.24 but dont know baseline.     February 24, 2019  AM bp is normal, feels good, urinating    2. ID- possible influenza. CXR clear and influenza screen neg though influenza pcr pending. Started on empiric tamaflu by ed and will cont for now    02/24/19   flu pcr neg , tamiflu stopped     3.Macrocytosis- unclear etiology. Could be related to etoh with hx of 0-6 beers per day but lft nl. Will check b12. Folate, tsh, retic count.    2/23/2019 b12 normal, folate pending, retic normal, ? etoh related    4. Tobacco abuse- wants to quit and has tried a lot of things which have not worked. Will order nicotine patch while here     5.  Probable seborrheic dermatitis, face/ears- trial hydrocortisone cream     6. Cerumen impaction- debrox        DVT Prophylaxis:  Sequential, ambulate  Code Status:  Dnr/dni per discussion with patient today    Observe until midday-discharge if stable.

## 2019-03-01 LAB
BACTERIA SPEC CULT: NO GROWTH
BACTERIA SPEC CULT: NO GROWTH
Lab: NORMAL
Lab: NORMAL
SPECIMEN SOURCE: NORMAL
SPECIMEN SOURCE: NORMAL

## 2019-03-18 ENCOUNTER — ANESTHESIA EVENT (OUTPATIENT)
Dept: GASTROENTEROLOGY | Facility: CLINIC | Age: 69
End: 2019-03-18
Payer: MEDICARE

## 2019-03-18 ASSESSMENT — LIFESTYLE VARIABLES: TOBACCO_USE: 1

## 2019-03-18 NOTE — ANESTHESIA PREPROCEDURE EVALUATION
Anesthesia Pre-Procedure Evaluation    Patient: Julissa Almazan   MRN: [id number] : 1950          Preoperative Diagnosis: screening    Procedure(s):  COLONOSCOPY    Past Medical History:   Diagnosis Date     Depression      Hypertension      Past Surgical History:   Procedure Laterality Date     BACK SURGERY       COLONOSCOPY  2013    Procedure: COLONOSCOPY;  Colonoscopy  ;  Surgeon: Quincy Meredith MD;  Location: [facility]       Anesthesia Evaluation     . Pt has had prior anesthetic. Type: General and MAC           ROS/MED HX    ENT/Pulmonary:     (+)tobacco use, Current use 1 packs/day  , . .    Neurologic:  - neg neurologic ROS     Cardiovascular: Comment: hypotension    (+) hypertension----. : . . . :. . pulmonary hypertension, Previous cardiac testing date:results:date: results:ECG reviewed date:19 results:Sinus Rhythm   -Anterolateral ST-elevation -repolarization variant.     PROBABLY NORMAL   date: results:          METS/Exercise Tolerance:  >4 METS   Hematologic:  - neg hematologic  ROS       Musculoskeletal:   (+) , , other musculoskeletal- Hx back surgery      GI/Hepatic:  - neg GI/hepatic ROS      (-) liver disease   Renal/Genitourinary:  - ROS Renal section negative       Endo:  - neg endo ROS       Psychiatric:     (+) psychiatric history depression      Infectious Disease:  - neg infectious disease ROS       Malignancy:      - no malignancy   Other:    - neg other ROS                      Physical Exam  Normal systems: cardiovascular, pulmonary and dental    Airway   Mallampati: II    Dental   (+) missing  Comment: Poor dentition    Cardiovascular       Pulmonary             Lab Results   Component Value Date    WBC 5.0 2019    HGB 13.6 2019    HCT 42.6 2019     2019     2019    POTASSIUM 4.2 2019    CHLORIDE 104 2019    CO2 28 2019    BUN 15 2019    CR 0.90 2019    GLC 92 2019    MAGUE 8.0 (L) 2019     "ALBUMIN 3.5 02/23/2019    PROTTOTAL 7.4 02/23/2019    ALT 24 02/23/2019    AST 28 02/23/2019    ALKPHOS 51 02/23/2019    BILITOTAL 0.2 02/23/2019    INR 0.94 02/23/2019    TSH 3.97 02/23/2019       Preop Vitals  BP Readings from Last 3 Encounters:   02/24/19 124/70   11/21/13 116/87    Pulse Readings from Last 3 Encounters:   02/24/19 62      Resp Readings from Last 3 Encounters:   02/24/19 18   11/21/13 16    SpO2 Readings from Last 3 Encounters:   02/24/19 100%   11/21/13 97%      Temp Readings from Last 1 Encounters:   02/24/19 36.4  C (97.5  F) (Oral)    Ht Readings from Last 1 Encounters:   02/23/19 1.778 m (5' 10\")      Wt Readings from Last 1 Encounters:   02/23/19 67.4 kg (148 lb 9.4 oz)    Estimated body mass index is 21.32 kg/m  as calculated from the following:    Height as of 2/23/19: 1.778 m (5' 10\").    Weight as of 2/23/19: 67.4 kg (148 lb 9.4 oz).       Anesthesia Plan      History & Physical Review  History and physical reviewed and following examination; no interval change.    ASA Status:  2 .    NPO Status:  > 6 hours    Plan for MAC Reason for MAC:  Deep or markedly invasive procedure (G8)         Postoperative Care      Consents  Anesthetic plan, risks, benefits and alternatives discussed with:  Patient..                 Hermelinda Waddell APRN CRNA  "

## 2019-03-19 RX ORDER — LISINOPRIL 10 MG/1
10 TABLET ORAL
Status: ON HOLD | COMMUNITY
Start: 2019-02-27 | End: 2023-01-01

## 2019-03-21 ENCOUNTER — HOSPITAL ENCOUNTER (OUTPATIENT)
Facility: CLINIC | Age: 69
Discharge: HOME | End: 2019-03-21
Attending: SURGERY | Admitting: SURGERY
Payer: MEDICARE

## 2019-03-21 ENCOUNTER — ANESTHESIA (OUTPATIENT)
Dept: GASTROENTEROLOGY | Facility: CLINIC | Age: 69
End: 2019-03-21
Payer: MEDICARE

## 2019-03-21 VITALS
BODY MASS INDEX: 20.76 KG/M2 | HEART RATE: 76 BPM | SYSTOLIC BLOOD PRESSURE: 119 MMHG | WEIGHT: 145 LBS | OXYGEN SATURATION: 97 % | HEIGHT: 70 IN | RESPIRATION RATE: 16 BRPM | DIASTOLIC BLOOD PRESSURE: 80 MMHG | TEMPERATURE: 94.6 F

## 2019-03-21 LAB — COLONOSCOPY: NORMAL

## 2019-03-21 PROCEDURE — G0121 COLON CA SCRN NOT HI RSK IND: HCPCS | Performed by: SURGERY

## 2019-03-21 PROCEDURE — 25000128 H RX IP 250 OP 636: Performed by: NURSE ANESTHETIST, CERTIFIED REGISTERED

## 2019-03-21 PROCEDURE — 45378 DIAGNOSTIC COLONOSCOPY: CPT | Performed by: SURGERY

## 2019-03-21 PROCEDURE — 25000125 ZZHC RX 250: Performed by: NURSE ANESTHETIST, CERTIFIED REGISTERED

## 2019-03-21 PROCEDURE — 37000008 ZZH ANESTHESIA TECHNICAL FEE, 1ST 30 MIN: Performed by: SURGERY

## 2019-03-21 PROCEDURE — 25000125 ZZHC RX 250: Performed by: SURGERY

## 2019-03-21 PROCEDURE — 25800030 ZZH RX IP 258 OP 636: Performed by: SURGERY

## 2019-03-21 RX ORDER — PROPOFOL 10 MG/ML
INJECTION, EMULSION INTRAVENOUS CONTINUOUS PRN
Status: DISCONTINUED | OUTPATIENT
Start: 2019-03-21 | End: 2019-03-21

## 2019-03-21 RX ORDER — PROPOFOL 10 MG/ML
INJECTION, EMULSION INTRAVENOUS PRN
Status: DISCONTINUED | OUTPATIENT
Start: 2019-03-21 | End: 2019-03-21

## 2019-03-21 RX ORDER — SODIUM CHLORIDE, SODIUM LACTATE, POTASSIUM CHLORIDE, CALCIUM CHLORIDE 600; 310; 30; 20 MG/100ML; MG/100ML; MG/100ML; MG/100ML
INJECTION, SOLUTION INTRAVENOUS CONTINUOUS
Status: DISCONTINUED | OUTPATIENT
Start: 2019-03-21 | End: 2019-03-21 | Stop reason: HOSPADM

## 2019-03-21 RX ORDER — GLYCOPYRROLATE 0.2 MG/ML
INJECTION, SOLUTION INTRAMUSCULAR; INTRAVENOUS PRN
Status: DISCONTINUED | OUTPATIENT
Start: 2019-03-21 | End: 2019-03-21

## 2019-03-21 RX ORDER — ONDANSETRON 2 MG/ML
INJECTION INTRAMUSCULAR; INTRAVENOUS PRN
Status: DISCONTINUED | OUTPATIENT
Start: 2019-03-21 | End: 2019-03-21

## 2019-03-21 RX ORDER — LIDOCAINE 40 MG/G
CREAM TOPICAL
Status: DISCONTINUED | OUTPATIENT
Start: 2019-03-21 | End: 2019-03-21 | Stop reason: HOSPADM

## 2019-03-21 RX ORDER — ONDANSETRON 2 MG/ML
4 INJECTION INTRAMUSCULAR; INTRAVENOUS
Status: DISCONTINUED | OUTPATIENT
Start: 2019-03-21 | End: 2019-03-21 | Stop reason: HOSPADM

## 2019-03-21 RX ADMIN — PROPOFOL 30 MG: 10 INJECTION, EMULSION INTRAVENOUS at 11:41

## 2019-03-21 RX ADMIN — PROPOFOL 30 MG: 10 INJECTION, EMULSION INTRAVENOUS at 11:39

## 2019-03-21 RX ADMIN — LIDOCAINE HYDROCHLORIDE 1 ML: 10 INJECTION, SOLUTION EPIDURAL; INFILTRATION; INTRACAUDAL; PERINEURAL at 11:21

## 2019-03-21 RX ADMIN — ONDANSETRON 4 MG: 2 INJECTION INTRAMUSCULAR; INTRAVENOUS at 11:39

## 2019-03-21 RX ADMIN — GLYCOPYRROLATE 0.2 MG: 0.2 INJECTION, SOLUTION INTRAMUSCULAR; INTRAVENOUS at 11:39

## 2019-03-21 RX ADMIN — PROPOFOL 200 MCG/KG/MIN: 10 INJECTION, EMULSION INTRAVENOUS at 11:39

## 2019-03-21 RX ADMIN — SODIUM CHLORIDE, POTASSIUM CHLORIDE, SODIUM LACTATE AND CALCIUM CHLORIDE: 600; 310; 30; 20 INJECTION, SOLUTION INTRAVENOUS at 11:20

## 2019-03-21 RX ADMIN — PROPOFOL 40 MG: 10 INJECTION, EMULSION INTRAVENOUS at 11:40

## 2019-03-21 ASSESSMENT — MIFFLIN-ST. JEOR: SCORE: 1428.97

## 2019-03-21 NOTE — H&P
"69 year old year old male here for colonoscopy for screening.    Patient Active Problem List   Diagnosis     Hypotension       Past Medical History:   Diagnosis Date     Depression      Hypertension        Past Surgical History:   Procedure Laterality Date     BACK SURGERY       COLONOSCOPY  11/21/2013    Procedure: COLONOSCOPY;  Colonoscopy  ;  Surgeon: Quincy Meredith MD;  Location: [facility]       @[facility] current outpatient medications on file.       No Known Allergies    Pt reports that he has been smoking cigarettes.  He has been smoking about 1.00 pack per day. He does not have any smokeless tobacco history on file. He reports that he drinks alcohol. He reports that he does not use drugs.    Exam:  BP 98/76   Temp 94.6  F (34.8  C) (Tympanic)   Resp 20   Ht 1.778 m (5' 10\")   Wt 65.8 kg (145 lb)   SpO2 100%   BMI 20.81 kg/m      Awake, Alert OX3  Lungs - CTA bilaterally  CV - RRR, no murmurs, distal pulses intact  Abd - soft, non-distended, non-tender, +BS  Extr - No cyanosis or edema    A/P 69 year old year old male in need of colonoscopy for screening. Risks, benefits, alternatives, and complications were discussed including the possibility of perforation and the patient agreed to proceed    Rashaad Ellis MD     "

## 2019-03-21 NOTE — ANESTHESIA POSTPROCEDURE EVALUATION
Patient: Julissa Almazan    Procedure(s):  COLONOSCOPY    Diagnosis:screening  Diagnosis Additional Information: No value filed.    Anesthesia Type:  MAC    Note:  Anesthesia Post Evaluation    Patient location during evaluation: Bedside  Patient participation: Able to fully participate in evaluation  Level of consciousness: awake and alert  Pain management: adequate  Airway patency: patent  Cardiovascular status: acceptable  Respiratory status: acceptable  Hydration status: acceptable  PONV: none     Anesthetic complications: None          Last vitals:  Vitals:    03/21/19 1105   BP: 98/76   Resp: 20   Temp: 34.8  C (94.6  F)   SpO2: 100%         Electronically Signed By: Ranjith Sheikh CRNA, APRN CRNA  March 21, 2019  11:56 AM

## 2020-05-27 ENCOUNTER — HOSPITAL ENCOUNTER (EMERGENCY)
Facility: CLINIC | Age: 70
Discharge: LEFT BEFORE DISCHARGE | End: 2020-05-27
Payer: MEDICARE

## 2020-05-27 VITALS
TEMPERATURE: 98.4 F | BODY MASS INDEX: 20.09 KG/M2 | SYSTOLIC BLOOD PRESSURE: 104 MMHG | OXYGEN SATURATION: 98 % | WEIGHT: 140 LBS | RESPIRATION RATE: 16 BRPM | DIASTOLIC BLOOD PRESSURE: 69 MMHG | HEART RATE: 87 BPM

## 2020-05-27 NOTE — ED NOTES
Pt not sure he would like to wait much longer. Vitals reassessed and he was updated on a possible time frame. Pt back in waiting room and will inform registration if he decides to leave before seeing MD

## 2020-05-27 NOTE — ED NOTES
Patient decided to leave without being seeing. Pt asked to return if he has any problems, worsening symptoms and develops any new symptoms. He was otherwise asked to follow up primary MD. Pr is agreeable to above plan and has signed the appropriate paperwork.

## 2023-01-01 ENCOUNTER — APPOINTMENT (OUTPATIENT)
Dept: ULTRASOUND IMAGING | Facility: CLINIC | Age: 73
End: 2023-01-01
Attending: INTERNAL MEDICINE
Payer: COMMERCIAL

## 2023-01-01 ENCOUNTER — APPOINTMENT (OUTPATIENT)
Dept: PHYSICAL THERAPY | Facility: CLINIC | Age: 73
End: 2023-01-01
Payer: COMMERCIAL

## 2023-01-01 ENCOUNTER — PATIENT OUTREACH (OUTPATIENT)
Dept: ONCOLOGY | Facility: CLINIC | Age: 73
End: 2023-01-01
Payer: COMMERCIAL

## 2023-01-01 ENCOUNTER — APPOINTMENT (OUTPATIENT)
Dept: OCCUPATIONAL THERAPY | Facility: CLINIC | Age: 73
End: 2023-01-01
Payer: COMMERCIAL

## 2023-01-01 ENCOUNTER — PATIENT OUTREACH (OUTPATIENT)
Dept: CARE COORDINATION | Facility: CLINIC | Age: 73
End: 2023-01-01
Payer: COMMERCIAL

## 2023-01-01 ENCOUNTER — PRE VISIT (OUTPATIENT)
Dept: ONCOLOGY | Facility: CLINIC | Age: 73
End: 2023-01-01
Payer: COMMERCIAL

## 2023-01-01 ENCOUNTER — APPOINTMENT (OUTPATIENT)
Dept: CT IMAGING | Facility: CLINIC | Age: 73
End: 2023-01-01
Attending: FAMILY MEDICINE
Payer: COMMERCIAL

## 2023-01-01 ENCOUNTER — HOSPITAL ENCOUNTER (OUTPATIENT)
Facility: CLINIC | Age: 73
Setting detail: OBSERVATION
Discharge: HOME HEALTH | End: 2023-06-23
Attending: FAMILY MEDICINE | Admitting: RADIOLOGY
Payer: COMMERCIAL

## 2023-01-01 VITALS
TEMPERATURE: 98.4 F | OXYGEN SATURATION: 96 % | DIASTOLIC BLOOD PRESSURE: 59 MMHG | HEART RATE: 87 BPM | WEIGHT: 128.53 LBS | HEIGHT: 69 IN | BODY MASS INDEX: 19.04 KG/M2 | SYSTOLIC BLOOD PRESSURE: 112 MMHG | RESPIRATION RATE: 16 BRPM

## 2023-01-01 DIAGNOSIS — C79.9 METASTATIC MALIGNANT NEOPLASM, UNSPECIFIED SITE (H): ICD-10-CM

## 2023-01-01 DIAGNOSIS — D72.823 LEUKEMOID REACTION: ICD-10-CM

## 2023-01-01 DIAGNOSIS — E86.0 DEHYDRATION: ICD-10-CM

## 2023-01-01 DIAGNOSIS — R62.7 FAILURE TO THRIVE IN ADULT: ICD-10-CM

## 2023-01-01 DIAGNOSIS — R53.1 GENERALIZED WEAKNESS: ICD-10-CM

## 2023-01-01 DIAGNOSIS — R93.89 ABNORMAL CT SCAN: ICD-10-CM

## 2023-01-01 LAB
ALBUMIN SERPL BCG-MCNC: 1.7 G/DL (ref 3.5–5.2)
ALBUMIN SERPL BCG-MCNC: 2.2 G/DL (ref 3.5–5.2)
ALBUMIN UR-MCNC: NEGATIVE MG/DL
ALP SERPL-CCNC: 193 U/L (ref 40–129)
ALP SERPL-CCNC: 291 U/L (ref 40–129)
ALT SERPL W P-5'-P-CCNC: 9 U/L (ref 0–70)
ALT SERPL W P-5'-P-CCNC: 9 U/L (ref 0–70)
ANION GAP SERPL CALCULATED.3IONS-SCNC: 8 MMOL/L (ref 7–15)
APPEARANCE UR: ABNORMAL
AST SERPL W P-5'-P-CCNC: 14 U/L (ref 0–45)
AST SERPL W P-5'-P-CCNC: 14 U/L (ref 0–45)
BACTERIA UR CULT: ABNORMAL
BASE EXCESS BLDV CALC-SCNC: 5.8 MMOL/L (ref -7.7–1.9)
BASOPHILS # BLD AUTO: 0.2 10E3/UL (ref 0–0.2)
BASOPHILS NFR BLD AUTO: 0 %
BILIRUB SERPL-MCNC: 0.2 MG/DL
BILIRUB SERPL-MCNC: 0.2 MG/DL
BILIRUB UR QL STRIP: NEGATIVE
BUN SERPL-MCNC: 10.6 MG/DL (ref 8–23)
BUN SERPL-MCNC: 6.6 MG/DL (ref 8–23)
BUN SERPL-MCNC: 8.8 MG/DL (ref 8–23)
CALCIUM SERPL-MCNC: 7.3 MG/DL (ref 8.8–10.2)
CALCIUM SERPL-MCNC: 7.5 MG/DL (ref 8.8–10.2)
CALCIUM SERPL-MCNC: 8.2 MG/DL (ref 8.8–10.2)
CHLORIDE SERPL-SCNC: 101 MMOL/L (ref 98–107)
CHLORIDE SERPL-SCNC: 99 MMOL/L (ref 98–107)
CHLORIDE SERPL-SCNC: 99 MMOL/L (ref 98–107)
COLOR UR AUTO: YELLOW
CREAT SERPL-MCNC: 0.64 MG/DL (ref 0.67–1.17)
CREAT SERPL-MCNC: 0.64 MG/DL (ref 0.67–1.17)
CREAT SERPL-MCNC: 0.7 MG/DL (ref 0.67–1.17)
DEPRECATED HCO3 PLAS-SCNC: 24 MMOL/L (ref 22–29)
DEPRECATED HCO3 PLAS-SCNC: 25 MMOL/L (ref 22–29)
DEPRECATED HCO3 PLAS-SCNC: 26 MMOL/L (ref 22–29)
EOSINOPHIL # BLD AUTO: 0.1 10E3/UL (ref 0–0.7)
EOSINOPHIL NFR BLD AUTO: 0 %
ERYTHROCYTE [DISTWIDTH] IN BLOOD BY AUTOMATED COUNT: 13.3 % (ref 10–15)
FERRITIN SERPL-MCNC: 84 NG/ML (ref 31–409)
FOLATE SERPL-MCNC: <2 NG/ML (ref 4.6–34.8)
GFR SERPL CREATININE-BSD FRML MDRD: >90 ML/MIN/1.73M2
GLUCOSE SERPL-MCNC: 103 MG/DL (ref 70–99)
GLUCOSE SERPL-MCNC: 62 MG/DL (ref 70–99)
GLUCOSE SERPL-MCNC: 81 MG/DL (ref 70–99)
GLUCOSE UR STRIP-MCNC: NEGATIVE MG/DL
HCO3 BLDV-SCNC: 31 MMOL/L (ref 21–28)
HCT VFR BLD AUTO: 25.3 % (ref 40–53)
HCT VFR BLD AUTO: 25.7 % (ref 40–53)
HCT VFR BLD AUTO: 26.3 % (ref 40–53)
HGB BLD-MCNC: 8 G/DL (ref 13.3–17.7)
HGB BLD-MCNC: 8.2 G/DL (ref 13.3–17.7)
HGB BLD-MCNC: 8.4 G/DL (ref 13.3–17.7)
HGB UR QL STRIP: NEGATIVE
HOLD SPECIMEN: NORMAL
IMM GRANULOCYTES # BLD: 1.7 10E3/UL
IMM GRANULOCYTES NFR BLD: 3 %
IRON BINDING CAPACITY (ROCHE): 121 UG/DL (ref 240–430)
IRON SATN MFR SERPL: 18 % (ref 15–46)
IRON SERPL-MCNC: 22 UG/DL (ref 61–157)
KETONES UR STRIP-MCNC: NEGATIVE MG/DL
LEUKOCYTE ESTERASE UR QL STRIP: ABNORMAL
LIPASE SERPL-CCNC: 24 U/L (ref 13–60)
LYMPHOCYTES # BLD AUTO: 2.2 10E3/UL (ref 0.8–5.3)
LYMPHOCYTES NFR BLD AUTO: 4 %
MAGNESIUM SERPL-MCNC: 1.7 MG/DL (ref 1.7–2.3)
MAGNESIUM SERPL-MCNC: 1.8 MG/DL (ref 1.7–2.3)
MAGNESIUM SERPL-MCNC: 1.9 MG/DL (ref 1.7–2.3)
MCH RBC QN AUTO: 32.7 PG (ref 26.5–33)
MCH RBC QN AUTO: 33.2 PG (ref 26.5–33)
MCH RBC QN AUTO: 34.1 PG (ref 26.5–33)
MCHC RBC AUTO-ENTMCNC: 31.2 G/DL (ref 31.5–36.5)
MCHC RBC AUTO-ENTMCNC: 31.6 G/DL (ref 31.5–36.5)
MCHC RBC AUTO-ENTMCNC: 32.7 G/DL (ref 31.5–36.5)
MCV RBC AUTO: 105 FL (ref 78–100)
MONOCYTES # BLD AUTO: 1.7 10E3/UL (ref 0–1.3)
MONOCYTES NFR BLD AUTO: 3 %
MUCOUS THREADS #/AREA URNS LPF: PRESENT /LPF
NEUTROPHILS # BLD AUTO: 49 10E3/UL (ref 1.6–8.3)
NEUTROPHILS NFR BLD AUTO: 90 %
NITRATE UR QL: NEGATIVE
NRBC # BLD AUTO: 0 10E3/UL
NRBC BLD AUTO-RTO: 0 /100
O2/TOTAL GAS SETTING VFR VENT: 0 %
OSMOLALITY UR: 589 MMOL/KG (ref 100–1200)
PATH REPORT.COMMENTS IMP SPEC: ABNORMAL
PATH REPORT.COMMENTS IMP SPEC: YES
PATH REPORT.FINAL DX SPEC: ABNORMAL
PATH REPORT.GROSS SPEC: ABNORMAL
PATH REPORT.MICROSCOPIC SPEC OTHER STN: ABNORMAL
PATH REPORT.MICROSCOPIC SPEC OTHER STN: ABNORMAL
PATH REPORT.RELEVANT HX SPEC: ABNORMAL
PCO2 BLDV: 48 MM HG (ref 40–50)
PH BLDV: 7.42 [PH] (ref 7.32–7.43)
PH UR STRIP: 5 [PH] (ref 5–7)
PHOSPHATE SERPL-MCNC: 3.3 MG/DL (ref 2.5–4.5)
PHOTO IMAGE: ABNORMAL
PLATELET # BLD AUTO: 559 10E3/UL (ref 150–450)
PLATELET # BLD AUTO: 563 10E3/UL (ref 150–450)
PLATELET # BLD AUTO: 585 10E3/UL (ref 150–450)
PO2 BLDV: 20 MM HG (ref 25–47)
POTASSIUM SERPL-SCNC: 3.1 MMOL/L (ref 3.4–5.3)
POTASSIUM SERPL-SCNC: 3.4 MMOL/L (ref 3.4–5.3)
POTASSIUM SERPL-SCNC: 3.5 MMOL/L (ref 3.4–5.3)
POTASSIUM SERPL-SCNC: 3.5 MMOL/L (ref 3.4–5.3)
POTASSIUM SERPL-SCNC: 3.6 MMOL/L (ref 3.4–5.3)
PROT SERPL-MCNC: 4.8 G/DL (ref 6.4–8.3)
PROT SERPL-MCNC: 5.6 G/DL (ref 6.4–8.3)
RBC # BLD AUTO: 2.41 10E6/UL (ref 4.4–5.9)
RBC # BLD AUTO: 2.46 10E6/UL (ref 4.4–5.9)
RBC # BLD AUTO: 2.51 10E6/UL (ref 4.4–5.9)
RBC URINE: 2 /HPF
SODIUM SERPL-SCNC: 132 MMOL/L (ref 136–145)
SODIUM SERPL-SCNC: 133 MMOL/L (ref 136–145)
SODIUM SERPL-SCNC: 133 MMOL/L (ref 136–145)
SODIUM UR-SCNC: <20 MMOL/L
SP GR UR STRIP: 1.03 (ref 1–1.03)
SQUAMOUS EPITHELIAL: <1 /HPF
TSH SERPL DL<=0.005 MIU/L-ACNC: 1.81 UIU/ML (ref 0.3–4.2)
UROBILINOGEN UR STRIP-MCNC: NORMAL MG/DL
VIT B12 SERPL-MCNC: 650 PG/ML (ref 232–1245)
WBC # BLD AUTO: 50.7 10E3/UL (ref 4–11)
WBC # BLD AUTO: 52.4 10E3/UL (ref 4–11)
WBC # BLD AUTO: 54.9 10E3/UL (ref 4–11)
WBC URINE: 40 /HPF

## 2023-01-01 PROCEDURE — G0378 HOSPITAL OBSERVATION PER HR: HCPCS

## 2023-01-01 PROCEDURE — 250N000013 HC RX MED GY IP 250 OP 250 PS 637: Performed by: INTERNAL MEDICINE

## 2023-01-01 PROCEDURE — 258N000003 HC RX IP 258 OP 636: Performed by: FAMILY MEDICINE

## 2023-01-01 PROCEDURE — 97161 PT EVAL LOW COMPLEX 20 MIN: CPT | Mod: GP | Performed by: PHYSICAL THERAPIST

## 2023-01-01 PROCEDURE — 85027 COMPLETE CBC AUTOMATED: CPT | Performed by: INTERNAL MEDICINE

## 2023-01-01 PROCEDURE — 96360 HYDRATION IV INFUSION INIT: CPT | Mod: 59 | Performed by: FAMILY MEDICINE

## 2023-01-01 PROCEDURE — 2894A SURGICAL PATHOLOGY EXAM: CPT | Mod: 26 | Performed by: PATHOLOGY

## 2023-01-01 PROCEDURE — 83935 ASSAY OF URINE OSMOLALITY: CPT | Performed by: INTERNAL MEDICINE

## 2023-01-01 PROCEDURE — 88305 TISSUE EXAM BY PATHOLOGIST: CPT | Mod: TC | Performed by: INTERNAL MEDICINE

## 2023-01-01 PROCEDURE — 99285 EMERGENCY DEPT VISIT HI MDM: CPT | Mod: 25 | Performed by: FAMILY MEDICINE

## 2023-01-01 PROCEDURE — 36415 COLL VENOUS BLD VENIPUNCTURE: CPT | Performed by: INTERNAL MEDICINE

## 2023-01-01 PROCEDURE — 99222 1ST HOSP IP/OBS MODERATE 55: CPT | Mod: AI | Performed by: INTERNAL MEDICINE

## 2023-01-01 PROCEDURE — 80053 COMPREHEN METABOLIC PANEL: CPT | Performed by: INTERNAL MEDICINE

## 2023-01-01 PROCEDURE — 84300 ASSAY OF URINE SODIUM: CPT | Performed by: INTERNAL MEDICINE

## 2023-01-01 PROCEDURE — 82310 ASSAY OF CALCIUM: CPT | Performed by: INTERNAL MEDICINE

## 2023-01-01 PROCEDURE — 250N000011 HC RX IP 250 OP 636: Performed by: FAMILY MEDICINE

## 2023-01-01 PROCEDURE — 74177 CT ABD & PELVIS W/CONTRAST: CPT

## 2023-01-01 PROCEDURE — 97116 GAIT TRAINING THERAPY: CPT | Mod: GP | Performed by: PHYSICAL MEDICINE & REHABILITATION

## 2023-01-01 PROCEDURE — 96361 HYDRATE IV INFUSION ADD-ON: CPT

## 2023-01-01 PROCEDURE — 85004 AUTOMATED DIFF WBC COUNT: CPT | Performed by: FAMILY MEDICINE

## 2023-01-01 PROCEDURE — 88305 TISSUE EXAM BY PATHOLOGIST: CPT | Mod: 26 | Performed by: PATHOLOGY

## 2023-01-01 PROCEDURE — 84100 ASSAY OF PHOSPHORUS: CPT | Performed by: INTERNAL MEDICINE

## 2023-01-01 PROCEDURE — 88365 INSITU HYBRIDIZATION (FISH): CPT | Mod: 26 | Performed by: PATHOLOGY

## 2023-01-01 PROCEDURE — 87086 URINE CULTURE/COLONY COUNT: CPT | Performed by: INTERNAL MEDICINE

## 2023-01-01 PROCEDURE — 83550 IRON BINDING TEST: CPT | Performed by: INTERNAL MEDICINE

## 2023-01-01 PROCEDURE — 93010 ELECTROCARDIOGRAM REPORT: CPT | Performed by: FAMILY MEDICINE

## 2023-01-01 PROCEDURE — 83735 ASSAY OF MAGNESIUM: CPT | Performed by: INTERNAL MEDICINE

## 2023-01-01 PROCEDURE — 97116 GAIT TRAINING THERAPY: CPT | Mod: GP | Performed by: PHYSICAL THERAPIST

## 2023-01-01 PROCEDURE — 84443 ASSAY THYROID STIM HORMONE: CPT | Performed by: FAMILY MEDICINE

## 2023-01-01 PROCEDURE — 83690 ASSAY OF LIPASE: CPT | Performed by: FAMILY MEDICINE

## 2023-01-01 PROCEDURE — 82803 BLOOD GASES ANY COMBINATION: CPT | Performed by: FAMILY MEDICINE

## 2023-01-01 PROCEDURE — 82607 VITAMIN B-12: CPT | Performed by: INTERNAL MEDICINE

## 2023-01-01 PROCEDURE — 250N000009 HC RX 250: Performed by: RADIOLOGY

## 2023-01-01 PROCEDURE — 97165 OT EVAL LOW COMPLEX 30 MIN: CPT | Mod: GO

## 2023-01-01 PROCEDURE — 93005 ELECTROCARDIOGRAM TRACING: CPT | Performed by: FAMILY MEDICINE

## 2023-01-01 PROCEDURE — 82728 ASSAY OF FERRITIN: CPT | Performed by: INTERNAL MEDICINE

## 2023-01-01 PROCEDURE — 84132 ASSAY OF SERUM POTASSIUM: CPT | Mod: 91 | Performed by: INTERNAL MEDICINE

## 2023-01-01 PROCEDURE — 81001 URINALYSIS AUTO W/SCOPE: CPT | Performed by: INTERNAL MEDICINE

## 2023-01-01 PROCEDURE — 97530 THERAPEUTIC ACTIVITIES: CPT | Mod: GO

## 2023-01-01 PROCEDURE — 96361 HYDRATE IV INFUSION ADD-ON: CPT | Performed by: FAMILY MEDICINE

## 2023-01-01 PROCEDURE — 36415 COLL VENOUS BLD VENIPUNCTURE: CPT | Performed by: FAMILY MEDICINE

## 2023-01-01 PROCEDURE — 80053 COMPREHEN METABOLIC PANEL: CPT | Performed by: FAMILY MEDICINE

## 2023-01-01 PROCEDURE — 88344 IMHCHEM/IMCYTCHM EA MLT ANTB: CPT | Mod: 26 | Performed by: PATHOLOGY

## 2023-01-01 PROCEDURE — 99239 HOSP IP/OBS DSCHRG MGMT >30: CPT | Performed by: INTERNAL MEDICINE

## 2023-01-01 PROCEDURE — 272N000431 US BIOPSY FINE NEEDLE ASPIRATION LYMPH NODE

## 2023-01-01 PROCEDURE — 82746 ASSAY OF FOLIC ACID SERUM: CPT | Performed by: INTERNAL MEDICINE

## 2023-01-01 PROCEDURE — 250N000009 HC RX 250: Performed by: FAMILY MEDICINE

## 2023-01-01 RX ORDER — TIOTROPIUM BROMIDE 18 UG/1
18 CAPSULE ORAL; RESPIRATORY (INHALATION) DAILY
COMMUNITY

## 2023-01-01 RX ORDER — VENLAFAXINE 100 MG/1
100 TABLET ORAL EVERY MORNING
Status: DISCONTINUED | OUTPATIENT
Start: 2023-01-01 | End: 2023-01-01

## 2023-01-01 RX ORDER — POTASSIUM CHLORIDE 1500 MG/1
40 TABLET, EXTENDED RELEASE ORAL ONCE
Status: COMPLETED | OUTPATIENT
Start: 2023-01-01 | End: 2023-01-01

## 2023-01-01 RX ORDER — ALBUTEROL SULFATE 90 UG/1
1-2 AEROSOL, METERED RESPIRATORY (INHALATION) EVERY 6 HOURS PRN
Status: DISCONTINUED | OUTPATIENT
Start: 2023-01-01 | End: 2023-01-01 | Stop reason: HOSPADM

## 2023-01-01 RX ORDER — VENLAFAXINE 100 MG/1
200 TABLET ORAL EVERY MORNING
Status: DISCONTINUED | OUTPATIENT
Start: 2023-01-01 | End: 2023-01-01

## 2023-01-01 RX ORDER — IOPAMIDOL 755 MG/ML
60 INJECTION, SOLUTION INTRAVASCULAR ONCE
Status: COMPLETED | OUTPATIENT
Start: 2023-01-01 | End: 2023-01-01

## 2023-01-01 RX ORDER — SODIUM CHLORIDE 9 MG/ML
1000 INJECTION, SOLUTION INTRAVENOUS CONTINUOUS
Status: DISCONTINUED | OUTPATIENT
Start: 2023-01-01 | End: 2023-01-01

## 2023-01-01 RX ORDER — VENLAFAXINE 100 MG/1
200 TABLET ORAL EVERY MORNING
Status: DISCONTINUED | OUTPATIENT
Start: 2023-01-01 | End: 2023-01-01 | Stop reason: HOSPADM

## 2023-01-01 RX ORDER — IOPAMIDOL 755 MG/ML
55 INJECTION, SOLUTION INTRAVASCULAR ONCE
Status: DISCONTINUED | OUTPATIENT
Start: 2023-01-01 | End: 2023-01-01

## 2023-01-01 RX ORDER — FOLIC ACID 1 MG/1
1 TABLET ORAL DAILY
Status: DISCONTINUED | OUTPATIENT
Start: 2023-01-01 | End: 2023-01-01 | Stop reason: HOSPADM

## 2023-01-01 RX ORDER — VENLAFAXINE 100 MG/1
100 TABLET ORAL ONCE
Status: COMPLETED | OUTPATIENT
Start: 2023-01-01 | End: 2023-01-01

## 2023-01-01 RX ORDER — FOLIC ACID 1 MG/1
1 TABLET ORAL DAILY
Qty: 30 TABLET | Refills: 0 | Status: SHIPPED | OUTPATIENT
Start: 2023-01-01

## 2023-01-01 RX ORDER — TIOTROPIUM BROMIDE 18 UG/1
1 CAPSULE ORAL; RESPIRATORY (INHALATION) DAILY
Status: DISCONTINUED | OUTPATIENT
Start: 2023-01-01 | End: 2023-01-01 | Stop reason: HOSPADM

## 2023-01-01 RX ADMIN — SODIUM CHLORIDE 1000 ML: 9 INJECTION, SOLUTION INTRAVENOUS at 01:51

## 2023-01-01 RX ADMIN — SODIUM CHLORIDE 1000 ML: 9 INJECTION, SOLUTION INTRAVENOUS at 10:03

## 2023-01-01 RX ADMIN — SODIUM CHLORIDE 93 ML: 9 INJECTION, SOLUTION INTRAVENOUS at 20:43

## 2023-01-01 RX ADMIN — TIOTROPIUM BROMIDE 18 MCG: 18 CAPSULE ORAL; RESPIRATORY (INHALATION) at 11:49

## 2023-01-01 RX ADMIN — TIOTROPIUM BROMIDE 18 MCG: 18 CAPSULE ORAL; RESPIRATORY (INHALATION) at 08:32

## 2023-01-01 RX ADMIN — SODIUM CHLORIDE 1000 ML: 9 INJECTION, SOLUTION INTRAVENOUS at 18:26

## 2023-01-01 RX ADMIN — VENLAFAXINE 100 MG: 100 TABLET ORAL at 16:11

## 2023-01-01 RX ADMIN — FOLIC ACID 1 MG: 1 TABLET ORAL at 08:32

## 2023-01-01 RX ADMIN — VENLAFAXINE 100 MG: 100 TABLET ORAL at 08:32

## 2023-01-01 RX ADMIN — LIDOCAINE HYDROCHLORIDE 5 ML: 10 INJECTION, SOLUTION EPIDURAL; INFILTRATION; INTRACAUDAL; PERINEURAL at 09:30

## 2023-01-01 RX ADMIN — IOPAMIDOL 60 ML: 755 INJECTION, SOLUTION INTRAVENOUS at 20:44

## 2023-01-01 RX ADMIN — SODIUM CHLORIDE 500 ML: 9 INJECTION, SOLUTION INTRAVENOUS at 22:16

## 2023-01-01 RX ADMIN — POTASSIUM CHLORIDE 40 MEQ: 1500 TABLET, EXTENDED RELEASE ORAL at 10:55

## 2023-01-01 RX ADMIN — VENLAFAXINE 100 MG: 100 TABLET ORAL at 10:11

## 2023-01-01 RX ADMIN — FOLIC ACID 1 MG: 1 TABLET ORAL at 11:49

## 2023-01-01 RX ADMIN — SODIUM CHLORIDE 1000 ML: 9 INJECTION, SOLUTION INTRAVENOUS at 01:56

## 2023-01-01 ASSESSMENT — ACTIVITIES OF DAILY LIVING (ADL)
TRANSFERRING: 1-->ASSISTANCE (EQUIPMENT/PERSON) NEEDED
ADLS_ACUITY_SCORE: 37
ADLS_ACUITY_SCORE: 33
WALKING_OR_CLIMBING_STAIRS: AMBULATION DIFFICULTY, ASSISTANCE 1 PERSON;STAIR CLIMBING DIFFICULTY, ASSISTANCE 1 PERSON;TRANSFERRING DIFFICULTY, ASSISTANCE 1 PERSON
ADLS_ACUITY_SCORE: 41
TOILETING: 1-->ASSISTANCE (EQUIPMENT/PERSON) NEEDED (NOT DEVELOPMENTALLY APPROPRIATE)
ADLS_ACUITY_SCORE: 41
NUMBER_OF_TIMES_PATIENT_HAS_FALLEN_WITHIN_LAST_SIX_MONTHS: 6
ADLS_ACUITY_SCORE: 41
ADLS_ACUITY_SCORE: 41
TOILETING: 1-->ASSISTANCE (EQUIPMENT/PERSON) NEEDED
ADLS_ACUITY_SCORE: 35
VISION_MANAGEMENT: GLASSES
CHANGE_IN_FUNCTIONAL_STATUS_SINCE_ONSET_OF_CURRENT_ILLNESS/INJURY: NO
DOING_ERRANDS_INDEPENDENTLY_DIFFICULTY: YES
TOILETING_ASSISTANCE: TOILETING DIFFICULTY, ASSISTANCE 1 PERSON
ADLS_ACUITY_SCORE: 41
ADLS_ACUITY_SCORE: 35
ADLS_ACUITY_SCORE: 41
PREVIOUS_RESPONSIBILITIES: MEDICATION MANAGEMENT
DIFFICULTY_COMMUNICATING: NO
ADLS_ACUITY_SCORE: 41
WEAR_GLASSES_OR_BLIND: YES
ADLS_ACUITY_SCORE: 37
ADLS_ACUITY_SCORE: 35
ADLS_ACUITY_SCORE: 37
DRESSING/BATHING_DIFFICULTY: NO
FALL_HISTORY_WITHIN_LAST_SIX_MONTHS: YES
ADLS_ACUITY_SCORE: 37
TOILETING_ISSUES: YES
DEPENDENT_IADLS:: TRANSPORTATION;COOKING
ADLS_ACUITY_SCORE: 35
ADLS_ACUITY_SCORE: 37
HEARING_DIFFICULTY_OR_DEAF: NO
DIFFICULTY_EATING/SWALLOWING: NO
ADLS_ACUITY_SCORE: 41
WALKING_OR_CLIMBING_STAIRS_DIFFICULTY: YES
ADLS_ACUITY_SCORE: 41
TOILETING_MANAGEMENT: INCONTINENCE
TRANSFERRING: 1-->ASSISTANCE (EQUIPMENT/PERSON) NEEDED (NOT DEVELOPMENTALLY APPROPRIATE)
CONCENTRATING,_REMEMBERING_OR_MAKING_DECISIONS_DIFFICULTY: YES
ADLS_ACUITY_SCORE: 41

## 2023-01-01 ASSESSMENT — ENCOUNTER SYMPTOMS
CONSTIPATION: 0
PALPITATIONS: 0
APPETITE CHANGE: 1
COUGH: 0
FEVER: 0
ABDOMINAL PAIN: 0
FATIGUE: 1
WHEEZING: 0
BLOOD IN STOOL: 0
SINUS PRESSURE: 0
SORE THROAT: 0
UNEXPECTED WEIGHT CHANGE: 1
CHILLS: 0
FREQUENCY: 0
ACTIVITY CHANGE: 1
SHORTNESS OF BREATH: 0
VOMITING: 0
DIAPHORESIS: 0
HEADACHES: 0
WEAKNESS: 1
DIARRHEA: 0
NAUSEA: 0
DYSURIA: 0

## 2023-06-21 PROBLEM — D72.823 LEUKEMOID REACTION: Status: ACTIVE | Noted: 2023-01-01

## 2023-06-21 PROBLEM — E86.0 DEHYDRATION: Status: ACTIVE | Noted: 2023-01-01

## 2023-06-21 PROBLEM — R62.7 FAILURE TO THRIVE IN ADULT: Status: ACTIVE | Noted: 2023-01-01

## 2023-06-21 PROBLEM — J44.9 CHRONIC AIRWAY OBSTRUCTION (H): Status: ACTIVE | Noted: 2023-01-01

## 2023-06-21 PROBLEM — R53.1 GENERALIZED WEAKNESS: Status: ACTIVE | Noted: 2023-01-01

## 2023-06-21 PROBLEM — C79.9 METASTATIC MALIGNANT NEOPLASM, UNSPECIFIED SITE (H): Status: ACTIVE | Noted: 2023-01-01

## 2023-06-21 PROBLEM — R93.89 ABNORMAL CT SCAN: Status: ACTIVE | Noted: 2023-01-01

## 2023-06-21 NOTE — ED TRIAGE NOTES
"Rapid weight loss, fatigue, and shortness of breath for the past month. Daily ETOH and tobacco use.     Weight loss of 25lbs over last month. Struggles to eat due to \" being winded\"      Triage Assessment     Row Name 06/21/23 6664       Triage Assessment (Adult)    Airway WDL WDL       Respiratory WDL    Respiratory WDL WDL       Skin Circulation/Temperature WDL    Skin Circulation/Temperature WDL WDL       Cardiac WDL    Cardiac WDL WDL       Peripheral/Neurovascular WDL    Peripheral Neurovascular WDL WDL       Cognitive/Neuro/Behavioral WDL    Cognitive/Neuro/Behavioral WDL WDL              "

## 2023-06-21 NOTE — ED PROVIDER NOTES
"  History     Chief Complaint   Patient presents with     Weight Loss     Rapid weight loss, fatigue, and shortness of breath for the past month. Daily ETOH and tobacco use.      Wound Check     \" Sitting so much that he probably has a sore on his bottom\"     HPI  Julissa Almazan is a 73 year old male who presents with history of chronic daily alcohol use although reduced recently, tobacco abuse history, COPD.  Presents here with 25 pound weight loss over the last month struggling to eat as his dyspnea has become worse on both exertion and on eating.  He coughs and sometimes this is productive.  He has no associated fever.  He has had significant fatigue.  Decreased appetite.  Occasional abdominal pain.  Recently saw his primary provider and had advancing of his inhalers.  On chronic lisinopril and Effexor aspirin and inhalers.  He also has been lying down more and has a sacral sore that is been worsening.  He is accompanied by his 2 daughters 1 of whom lives close.    Allergies:  No Known Allergies    Problem List:    Patient Active Problem List    Diagnosis Date Noted     Hypotension 02/23/2019     Priority: Medium        Past Medical History:    Past Medical History:   Diagnosis Date     Depression      Hypertension        Past Surgical History:    Past Surgical History:   Procedure Laterality Date     BACK SURGERY       COLONOSCOPY  11/21/2013    Procedure: COLONOSCOPY;  Colonoscopy  ;  Surgeon: Quincy Meredith MD;  Location: [facility]     COLONOSCOPY N/A 3/21/2019    Procedure: COLONOSCOPY;  Surgeon: Rashaad Ellis MD;  Location: [facility]       Family History:    Family History   Problem Relation Age of Onset     Heart Disease Father      Heart Disease Sister      Heart Disease Brother        Social History:  Marital Status:   [4]  Social History     Tobacco Use     Smoking status: Every Day     Packs/day: 1.00     Types: Cigarettes   Substance Use Topics     Alcohol use: Yes     Comment: 0-6 beers a day  " "    Drug use: No        Medications:    aspirin (ASA) 81 MG tablet  diclofenac (VOLTAREN) 75 MG EC tablet  lisinopril (PRINIVIL/ZESTRIL) 10 MG tablet  Pseudoephedrine-DM-GG-APAP (DAYQUIL LIQUICAPS OR)  sildenafil (VIAGRA) 100 MG tablet  venlafaxine (EFFEXOR) 100 MG tablet          Review of Systems   Constitutional: Positive for activity change, appetite change, fatigue and unexpected weight change. Negative for chills, diaphoresis and fever.   HENT: Negative for ear pain, sinus pressure and sore throat.    Eyes: Negative for visual disturbance.   Respiratory: Negative for cough, shortness of breath and wheezing.    Cardiovascular: Negative for chest pain and palpitations.   Gastrointestinal: Negative for abdominal pain, blood in stool, constipation, diarrhea, nausea and vomiting.   Genitourinary: Negative for dysuria, frequency and urgency.   Skin: Negative for rash.   Neurological: Positive for weakness. Negative for headaches.   All other systems reviewed and are negative.      Physical Exam   BP: 90/62  Pulse: 89  Temp: 97  F (36.1  C)  Resp: 20  Height: 175.3 cm (5' 9\")  Weight: 56.7 kg (125 lb)  SpO2: 100 %      Physical Exam  Constitutional:       General: He is in acute distress.      Appearance: He is not diaphoretic.   HENT:      Head: Atraumatic.   Eyes:      Conjunctiva/sclera: Conjunctivae normal.   Cardiovascular:      Rate and Rhythm: Normal rate.      Heart sounds: No murmur heard.  Pulmonary:      Effort: Pulmonary effort is normal. No respiratory distress.      Breath sounds: Normal breath sounds. No stridor. No wheezing or rhonchi.   Abdominal:      General: Abdomen is flat. There is no distension.      Palpations: Abdomen is soft. There is no mass.      Tenderness: There is abdominal tenderness. There is no guarding.   Musculoskeletal:      Cervical back: Neck supple.      Right lower leg: No edema.      Left lower leg: No edema.   Skin:     Coloration: Skin is not pale.      Findings: No rash. "   Neurological:      General: No focal deficit present.      Mental Status: He is alert and oriented to person, place, and time.      Cranial Nerves: No cranial nerve deficit.      Sensory: No sensory deficit.      Motor: No weakness.      Coordination: Coordination normal.     Cachectic appearing male.  Tenderness to palpation of the abdomen primarily in the right lower quadrant.  No distention.  He has a stage I decubitus lesion  no breakdown of the skin, there is erythema over the sacrum.  No signs of infection.  Thinning of the skin in this region.    ED Course                 EKG Interpretation:      Interpreted by Austin Johnson MD  EKG done at 6 1856 hrs. demonstrates a sinus rhythm 90 bpm normal axis.  There is no ST change.  No T wave changes.  This with exception of flattening in the inferior leads.  There is a poor R progression V1 through V3.  No ectopy.  Normal conduction.  Impression sinus rhythm 90 bpm no significant acute changes.       Procedures              Critical Care time:  none               Results for orders placed or performed during the hospital encounter of 06/21/23 (from the past 24 hour(s))   CBC with platelets differential    Narrative    The following orders were created for panel order CBC with platelets differential.  Procedure                               Abnormality         Status                     ---------                               -----------         ------                     CBC with platelets and d...[[id number]]  Abnormal            Final result                 Please view results for these tests on the individual orders.   Comprehensive metabolic panel   Result Value Ref Range    Sodium 133 (L) 136 - 145 mmol/L    Potassium 3.5 3.4 - 5.3 mmol/L    Chloride 99 98 - 107 mmol/L    Carbon Dioxide (CO2) 26 22 - 29 mmol/L    Anion Gap 8 7 - 15 mmol/L    Urea Nitrogen 10.6 8.0 - 23.0 mg/dL    Creatinine 0.70 0.67 - 1.17 mg/dL    Calcium 8.2 (L) 8.8 - 10.2 mg/dL    Glucose  103 (H) 70 - 99 mg/dL    Alkaline Phosphatase 193 (H) 40 - 129 U/L    AST 14 0 - 45 U/L    ALT 9 0 - 70 U/L    Protein Total 5.6 (L) 6.4 - 8.3 g/dL    Albumin 2.2 (L) 3.5 - 5.2 g/dL    Bilirubin Total 0.2 <=1.2 mg/dL    GFR Estimate >90 >60 mL/min/1.73m2   TSH with free T4 reflex   Result Value Ref Range    TSH 1.81 0.30 - 4.20 uIU/mL   Lipase   Result Value Ref Range    Lipase 24 13 - 60 U/L   Blood gas venous   Result Value Ref Range    pH Venous 7.42 7.32 - 7.43    pCO2 Venous 48 40 - 50 mm Hg    pO2 Venous 20 (L) 25 - 47 mm Hg    Bicarbonate Venous 31 (H) 21 - 28 mmol/L    Base Excess/Deficit (+/-) 5.8 (H) -7.7 - 1.9 mmol/L    FIO2 0    CBC with platelets and differential   Result Value Ref Range    WBC Count 54.9 (HH) 4.0 - 11.0 10e3/uL    RBC Count 2.46 (L) 4.40 - 5.90 10e6/uL    Hemoglobin 8.4 (L) 13.3 - 17.7 g/dL    Hematocrit 25.7 (L) 40.0 - 53.0 %     (H) 78 - 100 fL    MCH 34.1 (H) 26.5 - 33.0 pg    MCHC 32.7 31.5 - 36.5 g/dL    RDW 13.3 10.0 - 15.0 %    Platelet Count 559 (H) 150 - 450 10e3/uL    % Neutrophils 90 %    % Lymphocytes 4 %    % Monocytes 3 %    % Eosinophils 0 %    % Basophils 0 %    % Immature Granulocytes 3 %    NRBCs per 100 WBC 0 <1 /100    Absolute Neutrophils 49.0 (H) 1.6 - 8.3 10e3/uL    Absolute Lymphocytes 2.2 0.8 - 5.3 10e3/uL    Absolute Monocytes 1.7 (H) 0.0 - 1.3 10e3/uL    Absolute Eosinophils 0.1 0.0 - 0.7 10e3/uL    Absolute Basophils 0.2 0.0 - 0.2 10e3/uL    Absolute Immature Granulocytes 1.7 (H) <=0.4 10e3/uL    Absolute NRBCs 0.0 10e3/uL   [facility] Draw    Narrative    The following orders were created for panel order [facility] Draw.  Procedure                               Abnormality         Status                     ---------                               -----------         ------                     Extra Blue Top Tube[[id number]]                              Final result                 Please view results for these tests on the individual orders.   Extra Blue  Top Tube   Result Value Ref Range    Hold Specimen [facility]    CT Chest (PE) Abdomen Pelvis w Contrast    Narrative    EXAM: CT CHEST PE ABDOMEN PELVIS W CONTRAST  LOCATION: [facility]  DATE/TIME: 6/21/2023 9:01 PM CDT    INDICATION: profound weight loss >25 pounds, severe dyspnea on exertion  COMPARISON: Chest CT 01/12/2023.  TECHNIQUE: CT angiogram chest during arterial phase injection IV contrast. 2D and 3D MIP reconstructions were performed by the CT technologist. Dose reduction techniques were used.     TECHNIQUE: CT scan of the abdomen and pelvis was performed following injection of IV contrast. Multiplanar reformats were obtained. Dose reduction techniques were used.    CONTRAST: 60 mL Isovue 370      CTA CHEST FINDINGS:  ANGIOGRAM CHEST: No evidence for pulmonary embolism. Pulmonary arteries normal in caliber. Thoracic aorta normal in caliber. No aortic dissection or other acute abnormality.    HEART: Cardiac chambers within normal limits. Trace pericardial effusion. Moderate coronary arterial calcification.    LUNGS AND PLEURA: Mild upper zone emphysema. No pulmonary mass or consolidation. There is mild bronchial wall thickening and mucous plugging, greatest in the right lower lobe. There are multiple noncalcified pulmonary nodules in the lungs bilaterally,   largest in the left lung appears new left upper lobe and measures 1.3 cm; largest in the right lung measures 1.0 cm in the right lower lobe. Small bilateral pleural effusions layer dependently, left greater than right.    MEDIASTINUM: No abnormally enlarged mediastinal or hilar lymph nodes. There are abnormally enlarged retrocrural lymph nodes and shotty periaortic lymph nodes, and largest measuring 12 mm.    MUSCULOSKELETAL: No suspicious bone lesion.      CT ABDOMEN PELVIS FINDINGS:  HEPATOBILIARY: There are multiple ill-defined low-density lesions in the liver, for example measuring 1.5 cm area the diaphragmatic dome on axial  53, and more conspicuously in the right hepatic lobe inferiorly measuring 1.8 cm on axial 82. There are   additional subtle smaller low-density lesions scattered throughout the liver. No gallstones.    PANCREAS: Normal.    SPLEEN: Normal.    ADRENAL GLANDS: Normal.    KIDNEYS/BLADDER: No renal mass or hydronephrosis. No urinary tract calcification. There is circumferential bladder wall thickening..    BOWEL: Small volume ascites. No bowel obstruction. Normal caliber appendix. No abnormal bowel wall enhancement or thickening.    LYMPH NODES: There are numerous abnormally enlarged upper mesenteric lymph nodes. There is bulky retroperitoneal adenopathy, many nodes showing subtle low-density centrally suggesting necrosis. There is more extensive bulky external iliac chain   adenopathy, the largest kaela conglomerate encasing the left external iliac vessels, measuring 4.5 x 4.5 x 6.5 cm. There is a large left inguinal node measuring approximately 2 cm. Bulky adenopathy within the lower retroperitoneum encases the confluence   of the common iliac veins and lower IVC which appear severely narrowed but opacified. The upper IVC is normally opacified. No evidence for deep pelvic venous or caval thrombus.    VASCULATURE: Unremarkable.    PELVIC ORGANS: There is prominent edema and congestion throughout the pelvis, around the urinary bladder and rectum, likely related to the necrotic appearing adenopathy..    MUSCULOSKELETAL: No suspicious bone lesion. Severe multilevel degenerative change throughout the lumbar spine.        Impression    IMPRESSION:  1.  No evidence for pulmonary embolism.  2.  Multiple pulmonary nodules, liver lesions, and extensive lymph node enlargement in the lower chest, and throughout the abdomen and pelvis. Findings are compatible with metastatic disease, however, primary lesion is not identified. The largest nodule   in the left upper lobe may represent the primary lesion although there are numerous  other similar appearing pulmonary nodules. If tissue diagnosis is needed, there is a prominent left inguinal lymph node that would be amenable to percutaneous biopsy with   ultrasound guidance.  3.  Severe mass effect upon the iliac veins and lower IVC secondary to enlarged lymph nodes, however, no evidence for thrombus.  4.  Circumferential bladder wall thickening.       Medications   iopamidol (ISOVUE-370) solution 55 mL (has no administration in time range)   sodium chloride 0.9 % bag 500 mL for CT scan flush use (has no administration in time range)       Assessments & Plan (with Medical Decision Making)     MD<: Julissa Almazan is a 73 year old male presenting here with profound weight loss 25 pounds in the last month history of tobacco and alcohol abuse, abdominal pain left lower quadrant, severe dyspnea on exertion out of proportion to his prior COPD.  Normal oxygen saturations at rest and no tachypnea.  Decreased appetite.  Decubitus lesion stage I and over the sacral region.  Plan for extensive imaging CT of the chest PE protocol abdomen pelvis TSH electrolytes LFTs CBC.  Highly suspicious for malignancy.    Findings on laboratory testing included a leukemoid white cell response of over 50,000.  CT demonstrates likely metastatic lung cancer.  Will need tissue diagnosis.  Discussed these findings with the patient and his family.  He also has findings of dehydration on exam and has not yet been able to give a urine sample.  CT demonstrated also bladder wall thickening and there is a potential for UTI as well.  He was unable to ambulate very far without stopping.  His oxygen saturations did not drop during this time he did not appear to be hypoxic.  He did appear to have some balance problem at that point.  He has been too weak to return home and I recommended that he remain in the hospital and he agrees.     I have reviewed the nursing notes.    I have reviewed the findings, diagnosis, plan and need for follow  up with the patient.       ED to Inpatient Handoff:    Discussed with Maureen   Patient accepted for Observation Stay  Pending studies include none  Code Status: Not Addressed               Medical Decision Making  The patient's presentation was of moderate complexity (an undiagnosed new problem with uncertain diagnosis).    The patient's evaluation involved:  ordering and/or review of 3+ test(s) in this encounter (see separate area of note for details)    The patient's management necessitated high risk (a decision regarding hospitalization).        New Prescriptions    No medications on file       Final diagnoses:   Leukemoid reaction   Metastatic malignant neoplasm, unspecified site (H)   Failure to thrive in adult   Abnormal CT scan   Dehydration   Generalized weakness       6/21/2023   [facility] EMERGENCY DEPT     Austin Johnson MD  06/22/23 0058

## 2023-06-22 NOTE — H&P
[facility]    History and Physical - Hospitalist Service       Date of Admission:  6/21/2023    Assessment & Plan   Generalized weakness  -multifactorial: underlying malignancy, anemia, poor po intake    Metastatic CA, unknown primary  -pulmonary and liver lesions; extensive LAD in chest, abdomen and pelvis  -will need Onc followup    Leukocytosis, thrombocytosis  -leukemoid reaction and reactive thrombocytosis likely in setting of malignancy    Macrocytic anemia  -b12, folate, iron studies    Alcohol abuse  -previously drinking 6 beers per day; now cut back to 3/day for last few weeks  -no h/o withdrawal  -watch for signs of withdrawal; last drink yesterday    Tobacco abuse  -has cut back 1/4 ppd in last few weeks;  -declines nicotine replacement    COPD  -cont PTA Spiriva and prn albuterol    MDD  -cont venlafaxine       Diet: Advance Diet as Tolerated: Clear Liquid Diet    DVT Prophylaxis: scds  Bernal Catheter: Not present  Lines: None     Code Status:  FULL    Clinically Significant Risk Factors Present on Admission      # Drug Induced Platelet Defect: home medication list includes an antiplatelet medication   # Hypertension: Home medication list includes antihypertensive(s)               Disposition Plan      Expected Discharge Date: 06/22/2023                The patient's care was discussed with the patient.        SHAVONNE LEE MD  [facility]  Securely message with the Vocera Web Console (learn more here)  Text page via [employer] Paging/Directory      Visit/Communication Style   Virtual (Video) communication was used to evaluate Cassy Costa consented to the use of video communication: yes  Video START time: , 6/22/2023  Video STOP time: , 6/22/2023   Patient's location: [facility]   Provider's location during the visit: [facility] Tele-medicine site         ______________________________________________________________________    Chief Complaint   weakness    History of Present Illness    73yoM with MDD, COPD, tobacco abuse and alcohol abuse who presented to the ED with DENSON, weakness, unintentional weight loss and fatigue over the last 3 months.  He has lost about 25 pounds.  He has a poor appetite.  He says he came in because his family convinced him to do so.      He denies chest pain, abdominal pain, black or bloody stools, cough, fever, chills, sweats.      He has cut back on alcohol and tobacco in the last few weeks.  He denies alcohol withdrawal symptoms in the past.    Review of Systems    General: negative for fever, chills, sweats,   Eyes: negative for blurred vision, loss of vision  Ear Nose and Throat: negative for pharyngitis, speech or swallowing difficulties  Respiratory:  negative for sputum production, wheezing, pleuritic pain,or cough  Cardiology:  negative for chest pain, palpitations, orthopnea, PND, edema, syncope   Gastrointestinal: negative for abdominal pain, nausea, vomiting, diarrhea, constipation, hematemesis, melena or hematochezia  Genitourinary: negative for frequency, urgency, dysuria, hematuria   Neurological: negative for focal weakness, paresthesia    Past Medical History    I have reviewed this patient's medical history and updated it with pertinent information if needed.   Past Medical History:   Diagnosis Date     Depression      Hypertension        Past Surgical History   I have reviewed this patient's surgical history and updated it with pertinent information if needed.  Past Surgical History:   Procedure Laterality Date     BACK SURGERY       COLONOSCOPY  11/21/2013    Procedure: COLONOSCOPY;  Colonoscopy  ;  Surgeon: Quincy Meredith MD;  Location: [facility]     COLONOSCOPY N/A 3/21/2019    Procedure: COLONOSCOPY;  Surgeon: Rashaad Ellis MD;  Location: [facility]       Social History   I have reviewed this patient's social  history and updated it with pertinent information if needed.  Social History     Tobacco Use     Smoking status: Every Day     Packs/day: 1.00     Types: Cigarettes   Substance Use Topics     Alcohol use: Yes     Comment: 0-6 beers a day      Drug use: No       Family History   I have reviewed this patient's family history and updated it with pertinent information if needed.  Family History   Problem Relation Age of Onset     Heart Disease Father      Heart Disease Sister      Heart Disease Brother        Prior to Admission Medications   Prior to Admission Medications   Prescriptions Last Dose Informant Patient Reported? Taking?   Pseudoephedrine-DM-GG-APAP (DAYQUIL LIQUICAPS OR) Unknown  Yes Yes   Sig: Take  by mouth daily as needed.   aspirin (ASA) 81 MG tablet More than a month  Yes Yes   Sig: Take 81 mg by mouth daily   diclofenac (VOLTAREN) 75 MG EC tablet Unknown  Yes Yes   Sig: Take 1 tablet by mouth 2 times daily as needed. Back pain   lisinopril (PRINIVIL/ZESTRIL) 10 MG tablet Unknown  Yes Yes   Sig: Take 10 mg by mouth   sildenafil (VIAGRA) 100 MG tablet More than a month  Yes Yes   Sig: Take 1 tablet by mouth daily as needed.   venlafaxine (EFFEXOR) 100 MG tablet 6/21/2023  Yes Yes   Sig: Take 200 mg by mouth every morning      Facility-Administered Medications: None     Allergies   No Known Allergies    Physical Exam   Vital Signs: Temp: 97.5  F (36.4  C) Temp src: Oral BP: 106/60 Pulse: 84   Resp: 18 SpO2: 97 % O2 Device: None (Room air)    Weight: 125 lbs 0 oz    Audio device not working; exam is per bedside RN  Gen:  Frail,  in no acute distress, lying semi-supine in hospital stretcher  HEENT:  Anicteric sclera, PER, hearing intact to voice  Resp:  No accessory muscle use, breath sounds clear; no wheezes no rales no rhonchi  Card:  No murmur, normal S1, S2   Abd:  Soft per RN exam, no TTP, non-distended, normoactive bowel sounds are present  Musc:  Normal strength and movement of the major muscle  groups without obvious deformity  Psych:  Good insight, oriented to person, place and time, not anxious, not agitated    Data     Recent Labs   Lab 06/21/23 1929   WBC 54.9*   HGB 8.4*   *   *   *   POTASSIUM 3.5   CHLORIDE 99   CO2 26   BUN 10.6   CR 0.70   ANIONGAP 8   MAGUE 8.2*   *   ALBUMIN 2.2*   PROTTOTAL 5.6*   BILITOTAL 0.2   ALKPHOS 193*   ALT 9   AST 14   LIPASE 24         Recent Results (from the past 24 hour(s))   CT Chest (PE) Abdomen Pelvis w Contrast    Narrative    EXAM: CT CHEST PE ABDOMEN PELVIS W CONTRAST  LOCATION: [facility]  DATE/TIME: 6/21/2023 9:01 PM CDT    INDICATION: profound weight loss >25 pounds, severe dyspnea on exertion  COMPARISON: Chest CT 01/12/2023.  TECHNIQUE: CT angiogram chest during arterial phase injection IV contrast. 2D and 3D MIP reconstructions were performed by the CT technologist. Dose reduction techniques were used.     TECHNIQUE: CT scan of the abdomen and pelvis was performed following injection of IV contrast. Multiplanar reformats were obtained. Dose reduction techniques were used.    CONTRAST: 60 mL Isovue 370      CTA CHEST FINDINGS:  ANGIOGRAM CHEST: No evidence for pulmonary embolism. Pulmonary arteries normal in caliber. Thoracic aorta normal in caliber. No aortic dissection or other acute abnormality.    HEART: Cardiac chambers within normal limits. Trace pericardial effusion. Moderate coronary arterial calcification.    LUNGS AND PLEURA: Mild upper zone emphysema. No pulmonary mass or consolidation. There is mild bronchial wall thickening and mucous plugging, greatest in the right lower lobe. There are multiple noncalcified pulmonary nodules in the lungs bilaterally,   largest in the left lung appears new left upper lobe and measures 1.3 cm; largest in the right lung measures 1.0 cm in the right lower lobe. Small bilateral pleural effusions layer dependently, left greater than right.    MEDIASTINUM: No  abnormally enlarged mediastinal or hilar lymph nodes. There are abnormally enlarged retrocrural lymph nodes and shotty periaortic lymph nodes, and largest measuring 12 mm.    MUSCULOSKELETAL: No suspicious bone lesion.      CT ABDOMEN PELVIS FINDINGS:  HEPATOBILIARY: There are multiple ill-defined low-density lesions in the liver, for example measuring 1.5 cm area the diaphragmatic dome on axial 53, and more conspicuously in the right hepatic lobe inferiorly measuring 1.8 cm on axial 82. There are   additional subtle smaller low-density lesions scattered throughout the liver. No gallstones.    PANCREAS: Normal.    SPLEEN: Normal.    ADRENAL GLANDS: Normal.    KIDNEYS/BLADDER: No renal mass or hydronephrosis. No urinary tract calcification. There is circumferential bladder wall thickening..    BOWEL: Small volume ascites. No bowel obstruction. Normal caliber appendix. No abnormal bowel wall enhancement or thickening.    LYMPH NODES: There are numerous abnormally enlarged upper mesenteric lymph nodes. There is bulky retroperitoneal adenopathy, many nodes showing subtle low-density centrally suggesting necrosis. There is more extensive bulky external iliac chain   adenopathy, the largest kaela conglomerate encasing the left external iliac vessels, measuring 4.5 x 4.5 x 6.5 cm. There is a large left inguinal node measuring approximately 2 cm. Bulky adenopathy within the lower retroperitoneum encases the confluence   of the common iliac veins and lower IVC which appear severely narrowed but opacified. The upper IVC is normally opacified. No evidence for deep pelvic venous or caval thrombus.    VASCULATURE: Unremarkable.    PELVIC ORGANS: There is prominent edema and congestion throughout the pelvis, around the urinary bladder and rectum, likely related to the necrotic appearing adenopathy..    MUSCULOSKELETAL: No suspicious bone lesion. Severe multilevel degenerative change throughout the lumbar spine.        Impression     IMPRESSION:  1.  No evidence for pulmonary embolism.  2.  Multiple pulmonary nodules, liver lesions, and extensive lymph node enlargement in the lower chest, and throughout the abdomen and pelvis. Findings are compatible with metastatic disease, however, primary lesion is not identified. The largest nodule   in the left upper lobe may represent the primary lesion although there are numerous other similar appearing pulmonary nodules. If tissue diagnosis is needed, there is a prominent left inguinal lymph node that would be amenable to percutaneous biopsy with   ultrasound guidance.  3.  Severe mass effect upon the iliac veins and lower IVC secondary to enlarged lymph nodes, however, no evidence for thrombus.  4.  Circumferential bladder wall thickening.

## 2023-06-22 NOTE — PROGRESS NOTES
[facility]    Hospitalist Progress Note    Date of Service (when I saw the patient): 06/22/2023    Assessment & Plan   Julissa Almazan is a 73 year old male who was admitted on 6/21/2023 with dyspnea on exertion, weakness, and 25 pound weight loss over the past 3 months.    Generalized weakness  Patient lives by himself without family nearby.  -Consult PT and OT    Lymphadenopathy concerning for metastatic malignancy  CT chest and pelvis with contrast is negative for pulmonary embolism.  There are multiple pulmonary nodules, liver lesions, and extensive lymph node enlargement in the lower chest and throughout the abdomen and pelvis.  Largest node in the left upper lobe may represent primary lesion.  There is a prominent left inguinal lymph node amenable to percutaneous biopsy with ultrasound.  There is severe mass effect on the iliac veins and lower IVC secondary to enlarged lymph node but no evidence for thrombus.  There is circumferential bladder wall thickening.  -Discussed with radiology, patient will undergo ultrasound-guided core biopsy of the left inguinal node on 6/23.    Weight loss  Patient reports 25 pound weight loss in the past 3 months.  -Nutrition consult    Leukocytosis  Thrombocytosis  WBC 54.9, platelets 559.  -Follow CBC    Macrocytic anemia  Folate deficiency  Anemia of chronic disease  Hemoglobin 8.4 on admission.  No evidence of acute bleeding.  -Serum iron 22, iron binding capacity 121, iron saturation 18%, ferritin 84  -Folate less than 2.0, start folate 1 mg by mouth daily  -B12 level pending    Alcohol abuse  Previously had been drinking 6 beers per day, cut back to 3 beers per day for the last few weeks.  No history of withdrawal.  -Monitor for signs of alcohol withdrawal    Tobacco abuse  Patient is cut back to 1/4 pack/day and last few weeks.  -He has declined nicotine patch  -Encourage smoking cessation    COPD  No evidence of acute exacerbation  -Continue PTA  Spiriva and albuterol as needed    Depression  -Continue PTA venlafaxine    DVT Prophylaxis: Pneumatic Compression Devices  Code Status: Full Code    Disposition: Expected discharge in pending safe disposition.    Julio Cesar Heath MD    Interval History   Patient sitting up in bed.  He has no acute complaints.  He denies pain or nausea.    -Data reviewed today: I reviewed all new labs and imaging results over the last 24 hours. I personally reviewed no images or EKG's today.    Physical Exam   Temp: 98.1  F (36.7  C) Temp src: Oral BP: 94/49 Pulse: 85   Resp: 16 SpO2: 96 % O2 Device: None (Room air)    Vitals:    06/21/23 1736 06/22/23 0115   Weight: 56.7 kg (125 lb) 55 kg (121 lb 4.1 oz)     Vital Signs with Ranges  Temp:  [97  F (36.1  C)-98.1  F (36.7  C)] 98.1  F (36.7  C)  Pulse:  [83-89] 85  Resp:  [13-24] 16  BP: ()/(49-68) 94/49  SpO2:  [96 %-100 %] 96 %  I/O last 3 completed shifts:  In: 100 [P.O.:100]  Out: -     Gen: Cachectic elderly male, alert and oriented x 3, no acute distressed  HEENT: Atraumatic, bitemporal wasting; sclera non-injected, anicterric; oral mucosa moist, no lesion, no exudate  Lungs: Clear to ausculation, no wheezes, no rhonchi, no rales  Heart: Regular rate, regular rhythm, no gallops, no rubs, no murmurs  GI: Bowel sound normal, no hepatosplenomegaly, no masses, non-tender, non-distended, no guarding, no rebound tenderness  Lymph: No lymphadenopathy, no edema  Skin: No rashes, no chronic venous stasis     Medications     sodium chloride 1,000 mL (06/22/23 1003)       tiotropium  1 capsule Inhalation Daily     venlafaxine  100 mg Oral QAM       Data   Recent Labs   Lab 06/22/23  0621 06/21/23  1929   WBC 50.7* 54.9*   HGB 8.0* 8.4*   * 105*   * 559*   * 133*   POTASSIUM 3.1* 3.5   CHLORIDE 99 99   CO2 25 26   BUN 8.8 10.6   CR 0.64* 0.70   ANIONGAP 8 8   MAGUE 7.5* 8.2*   GLC 81 103*   ALBUMIN  --  2.2*   PROTTOTAL  --  5.6*   BILITOTAL  --  0.2   ALKPHOS   --  193*   ALT  --  9   AST  --  14   LIPASE  --  24       Recent Results (from the past 24 hour(s))   CT Chest (PE) Abdomen Pelvis w Contrast    Narrative    EXAM: CT CHEST PE ABDOMEN PELVIS W CONTRAST  LOCATION: [facility]  DATE/TIME: 6/21/2023 9:01 PM CDT    INDICATION: profound weight loss >25 pounds, severe dyspnea on exertion  COMPARISON: Chest CT 01/12/2023.  TECHNIQUE: CT angiogram chest during arterial phase injection IV contrast. 2D and 3D MIP reconstructions were performed by the CT technologist. Dose reduction techniques were used.     TECHNIQUE: CT scan of the abdomen and pelvis was performed following injection of IV contrast. Multiplanar reformats were obtained. Dose reduction techniques were used.    CONTRAST: 60 mL Isovue 370      CTA CHEST FINDINGS:  ANGIOGRAM CHEST: No evidence for pulmonary embolism. Pulmonary arteries normal in caliber. Thoracic aorta normal in caliber. No aortic dissection or other acute abnormality.    HEART: Cardiac chambers within normal limits. Trace pericardial effusion. Moderate coronary arterial calcification.    LUNGS AND PLEURA: Mild upper zone emphysema. No pulmonary mass or consolidation. There is mild bronchial wall thickening and mucous plugging, greatest in the right lower lobe. There are multiple noncalcified pulmonary nodules in the lungs bilaterally,   largest in the left lung appears new left upper lobe and measures 1.3 cm; largest in the right lung measures 1.0 cm in the right lower lobe. Small bilateral pleural effusions layer dependently, left greater than right.    MEDIASTINUM: No abnormally enlarged mediastinal or hilar lymph nodes. There are abnormally enlarged retrocrural lymph nodes and shotty periaortic lymph nodes, and largest measuring 12 mm.    MUSCULOSKELETAL: No suspicious bone lesion.      CT ABDOMEN PELVIS FINDINGS:  HEPATOBILIARY: There are multiple ill-defined low-density lesions in the liver, for example measuring  1.5 cm area the diaphragmatic dome on axial 53, and more conspicuously in the right hepatic lobe inferiorly measuring 1.8 cm on axial 82. There are   additional subtle smaller low-density lesions scattered throughout the liver. No gallstones.    PANCREAS: Normal.    SPLEEN: Normal.    ADRENAL GLANDS: Normal.    KIDNEYS/BLADDER: No renal mass or hydronephrosis. No urinary tract calcification. There is circumferential bladder wall thickening..    BOWEL: Small volume ascites. No bowel obstruction. Normal caliber appendix. No abnormal bowel wall enhancement or thickening.    LYMPH NODES: There are numerous abnormally enlarged upper mesenteric lymph nodes. There is bulky retroperitoneal adenopathy, many nodes showing subtle low-density centrally suggesting necrosis. There is more extensive bulky external iliac chain   adenopathy, the largest kaela conglomerate encasing the left external iliac vessels, measuring 4.5 x 4.5 x 6.5 cm. There is a large left inguinal node measuring approximately 2 cm. Bulky adenopathy within the lower retroperitoneum encases the confluence   of the common iliac veins and lower IVC which appear severely narrowed but opacified. The upper IVC is normally opacified. No evidence for deep pelvic venous or caval thrombus.    VASCULATURE: Unremarkable.    PELVIC ORGANS: There is prominent edema and congestion throughout the pelvis, around the urinary bladder and rectum, likely related to the necrotic appearing adenopathy..    MUSCULOSKELETAL: No suspicious bone lesion. Severe multilevel degenerative change throughout the lumbar spine.        Impression    IMPRESSION:  1.  No evidence for pulmonary embolism.  2.  Multiple pulmonary nodules, liver lesions, and extensive lymph node enlargement in the lower chest, and throughout the abdomen and pelvis. Findings are compatible with metastatic disease, however, primary lesion is not identified. The largest nodule   in the left upper lobe may represent the  primary lesion although there are numerous other similar appearing pulmonary nodules. If tissue diagnosis is needed, there is a prominent left inguinal lymph node that would be amenable to percutaneous biopsy with   ultrasound guidance.  3.  Severe mass effect upon the iliac veins and lower IVC secondary to enlarged lymph nodes, however, no evidence for thrombus.  4.  Circumferential bladder wall thickening.

## 2023-06-22 NOTE — PROGRESS NOTES
"    Reason for Admission: Generalized Weakness      Neuro: WNL    GI/: Incontinent of bladder. 1 unmeasured void on floor in front of toilet. Urine sent to lab for testing. Primofit in place d/t sacral sore and incontinence.     Resp: WNL    CV: WNL    Skin: Sore on coccyx/buttock, blanchable redness - covered with mepilex for protection.  Diet: Orders Placed This Encounter      Regular Diet Adult        Activity: A1 + walker and gait belt.    Lines/Drains: PIV NS @125    Vitals:/62 (BP Location: Left arm)   Pulse 82   Temp 97.6  F (36.4  C) (Oral)   Resp 16   Ht 1.753 m (5' 9\")   Wt 55 kg (121 lb 4.1 oz)   SpO2 100%   BMI 17.91 kg/m          Pain: denies      Plan: Lung biopsy, ultrasound tomorrow - continue plan of care.   "

## 2023-06-22 NOTE — PROGRESS NOTES
Skin affirmation note    Admitting nurse completed full skin assessment, Efrain score and Efrain interventions. This writer agrees with the initial skin assessment findings.

## 2023-06-22 NOTE — PROGRESS NOTES
Patient ambulated to toilet with A1 + walker & gait belt. He had a loose stool and voided but missed the toilet. Still awaiting urine for UA. Encouraged patient to continue drinking his water.

## 2023-06-22 NOTE — PROVIDER NOTIFICATION
Sent message via [id number] outdoors to Dr. Gomez regarding low BP reading of 94/49. Pt also said he recently had a dose change on Effexor to 100 mg day. Pharmacy informed me that Ellipta inhaler was very expensive and would benefit from having home inhaler brought in. Patient agreeable to having daughter bring it in. Dr. Gomez changed Effexor dose. No other orders at this time.

## 2023-06-22 NOTE — PROGRESS NOTES
06/22/23 0786   Appointment Info   Signing Clinician's Name / Credentials (PT) Anneliese Augustin, PT   Quick Adds   Quick Adds Certification   Living Environment   People in Home alone  (rambler)   Current Living Arrangements house   Home Accessibility stairs to enter home;stairs within home   Number of Stairs, Main Entrance 2   Number of Stairs, Within Home, Primary greater than 10 stairs   Living Environment Comments laundry in basement otherwise can stay on main level   Self-Care   Equipment Currently Used at Home none   Activity/Exercise/Self-Care Comment neighbor helps with meals, was indep without AD   General Information   Onset of Illness/Injury or Date of Surgery 06/21/23   Referring Physician Julio Cesar Heath MD   Patient/Family Therapy Goals Statement (PT) to return home   Pertinent History of Current Problem (include personal factors and/or comorbidities that impact the POC) wt loss, weakness, having workup for possible metastatic disease   Existing Precautions/Restrictions fall   Cognition   Affect/Mental Status (Cognition) WFL   Orientation Status (Cognition) oriented x 4   Pain Assessment   Patient Currently in Pain No   Posture    Posture Forward head position   Bed Mobility   Comment, (Bed Mobility) mod I supine<>sit   Transfers   Comment, (Transfers) S sit<>stand with FWW   Gait/Stairs (Locomotion)   [place] Level (Gait) supervision   Assistive Device (Gait) walker, front-wheeled   Distance in Feet 10   Distance in Feet (Gait) 30'   Pattern (Gait) swing-through   Deviations/Abnormal Patterns (Gait) sarath decreased;stride length decreased   Clinical Impression   Criteria for Skilled Therapeutic Intervention Yes, treatment indicated   PT Diagnosis (PT) impaired functional mobility   Influenced by the following impairments weakness, DENSON   Functional limitations due to impairments decreased activity tolerance   Clinical Presentation (PT Evaluation Complexity) Evolving/Changing   Clinical  Presentation Rationale clinical judgement   Clinical Decision Making (Complexity) low complexity   Planned Therapy Interventions (PT) home program guidelines;risk factor education;progressive activity/exercise;strengthening;gait training   Risk & Benefits of therapy have been explained evaluation/treatment results reviewed;care plan/treatment goals reviewed;risks/benefits reviewed;current/potential barriers reviewed;participants voiced agreement with care plan;patient   PT Total Evaluation Time   PT Eval, Low Complexity Minutes (46046) 8   Therapy Certification   Start of care date 06/22/23   Certification date from 06/22/23   Certification date to 06/29/23   Medical Diagnosis weakness   Physical Therapy Goals   PT Frequency 6x/week   PT Predicted Duration/Target Date for Goal Attainment 06/27/23   PT Goals Gait;Stairs   PT: Gait Independent;100 feet   PT: Stairs Modified independent;10 stairs;Rail on right   Interventions   Interventions Quick Adds Gait Training   Gait Training   Gait Training Minutes (98296) 8   Symptoms Noted During/After Treatment (Gait Training) fatigue;shortness of breath   Treatment Detail/Skilled Intervention with walker pt safe with navigation and turns, no LOB, without walker 20 ft with slower sarath and decreased step length but no LOB, did get SOB and light headed so sat down, reports may be able to get a walker if needed   PT Discharge Planning   PT Plan TH 1/6- gait without walker, act tolerance, stairs   PT Discharge Recommendation (DC Rec) home with assist;home with home care physical therapy   PT Rationale for DC Rec decreased act tolerance so may need assist with meals, housekeeping, laundry, meal prep and groceries   PT Brief overview of current status SBA without walker 20', 20 ft with walker SBA, indep with bed mob, SOB with little activity     [facility]  OUTPATIENT PHYSICAL THERAPY EVALUATION  PLAN OF TREATMENT FOR OUTPATIENT  REHABILITATION  (COMPLETE FOR INITIAL CLAIMS ONLY)  Patient's Last Name, First Name, M.I.  YOB: 1950  Julissa Almazan                        Provider's Name  [facility] Medical Record No.  [id number]                             Onset Date:  06/21/23   Start of Care Date:  (P) 06/22/23   Type:     _X_PT   ___OT   ___SLP Medical Diagnosis:  (P) weakness              PT Diagnosis:  (P) impaired functional mobility Visits from SOC:  1     See note for plan of treatment, functional goals and certification details    I CERTIFY THE NEED FOR THESE SERVICES FURNISHED UNDER        THIS PLAN OF TREATMENT AND WHILE UNDER MY CARE     (Physician co-signature of this document indicates review and certification of the therapy plan).

## 2023-06-22 NOTE — MEDICATION SCRIBE - ADMISSION MEDICATION HISTORY
"Medication Scribe Admission Medication History    Admission medication history is complete. The information provided in this note is only as accurate as the sources available at the time of the update.    Medication reconciliation/reorder completed by provider prior to medication history? Yes    Information Source(s): Patient via phone    Pertinent Information: Notes indicated patient mentioned Advair Diskus but when I spoke to patient on 06/22 AM he was unsure what I was referring to and stated the only inhaler he uses is the Spiriva. Also stated Lisinopril was discontinued by MD because it was \"no longer necessary\"    Changes made to PTA medication list:    Added: None    Deleted: Lisinopril, Dayquil    Changed: None    Medication Affordability:       Allergies reviewed with patient and updates made in EHR: yes    Medication History Completed By: Nicolette Mccarthy 6/22/2023 9:12 AM    Prior to Admission medications    Medication Sig Last Dose Taking? Auth Provider Long Term End Date   aspirin (ASA) 81 MG tablet Take 81 mg by mouth daily More than a month Yes Reported, Patient     sildenafil (VIAGRA) 100 MG tablet Take 1 tablet by mouth daily as needed. More than a month at prn Yes Reported, Patient     tiotropium (SPIRIVA) 18 MCG inhaled capsule Inhale 18 mcg into the lungs daily 6/21/2023 Yes Reported, Patient Yes    venlafaxine (EFFEXOR) 100 MG tablet Take 200 mg by mouth every morning 6/21/2023 at am Yes Reported, Patient         "

## 2023-06-22 NOTE — PROGRESS NOTES
"[place] [facility] ADMISSION NOTE    Patient admitted to room 2205 at approximately 0115 via cart from emergency room. Patient was accompanied by transport tech.     Verbal SBAR report received from ER Nurse prior to patient arrival.     Patient trasferred to bed via pivot transfer and 1 assist and gait belt.  Patient alert and oriented X 4. The patient is not having any pain.  . Admission vital signs: Blood pressure 106/60, pulse 84, temperature 97.5  F (36.4  C), temperature source Oral, resp. rate 18, height 1.753 m (5' 9\"), weight 56.7 kg (125 lb), SpO2 97 %. Patient was oriented to plan of care, call light, bed controls, tv, telephone, bathroom and visiting hours.     Risk Assessment    The following safety risks were identified during admission: fall. Yellow risk band applied: YES.     Skin Initial Assessment    This writer admitted this patient and completed a full skin assessment and Efrain score in the Adult PCS flowsheet. Appropriate interventions initiated as needed.     Secondary skin check completed by  Flora Hines RN.    Efrain Risk Assessment  Sensory Perception: 2-->very limited  Moisture: 1-->constantly moist  Activity: 3-->walks occasionally  Mobility: 2-->very limited  Nutrition: 1-->very poor  Friction and Shear: 2-->potential problem  Efrain Score: 11  Sensory/Activity/Mobility Interventions: Turn: left/right positioning, Pillows between bony prominence  Moisture Interventions: Incontinence pad, Urinary collection device  Nutrition Interventions: Encourage protein intake, Maintain adequate hydration, Nutrition consult  Friction/Shear Interventions: HOB 30 degrees or less, Silicone foam sacral dressing  Mattress: Standard gel/foam mattress (IsoFlex, Atmos Air, etc.)  Bed Frame: Standard width and length    Education    Patient has a [place] to Observation order: Yes  Observation education completed and documented: Yes      Ashley Ochoa RN    "

## 2023-06-22 NOTE — ED NOTES
"[facility]   Admission Handoff    The patient is Julissa Almazan, 73 year old who arrived in the ED by WHEELCHAIR from home with a complaint of Weight Loss (Rapid weight loss, fatigue, and shortness of breath for the past month. Daily ETOH and tobacco use. ) and Wound Check (\" Sitting so much that he probably has a sore on his bottom\")  . The patient's current symptoms are new and during this time the symptoms have remained the same. In the ED, patient was diagnosed with   Final diagnoses:   None         Needed?: No    Allergies:  No Known Allergies    Past Medical Hx:   Past Medical History:   Diagnosis Date    Depression     Hypertension        Initial vitals were: BP: 90/62  Pulse: 89  Temp: 97  F (36.1  C)  Resp: 20  Height: 175.3 cm (5' 9\")  Weight: 56.7 kg (125 lb)  SpO2: 100 %   Recent vital Signs: BP 92/59   Pulse 87   Temp 97  F (36.1  C) (Axillary)   Resp 13   Ht 1.753 m (5' 9\")   Wt 56.7 kg (125 lb)   SpO2 98%   BMI 18.46 kg/m      Elimination Status: Continent: Yes     Activity Level: Ax1 w/ walker    Fall Status: Reason for falls risk:  Mobility  activity supervised    Baseline Mental status: WDL  Current Mental Status changes: at basesline    Infection present or suspected this encounter: no  Sepsis suspected: No    Isolation type: None    Bariatric equipment needed?: No    In the ED these meds were given:   Medications   sodium chloride 0.9% infusion (has no administration in time range)   sodium chloride 0.9 % bag 500 mL for CT scan flush use (93 mLs Intravenous $Given 6/21/23 2043)   iopamidol (ISOVUE-370) solution 60 mL (60 mLs Intravenous $Given 6/21/23 2044)   0.9% sodium chloride BOLUS (500 mLs Intravenous $New Bag 6/21/23 2216)       Drips running?  No    Home pump  No    Current LDAs: Peripheral IV: Site RUE; Gauge 18  normal saline     Results:   Labs/Imaging  Ordered and Resulted from Time of ED Arrival Up to the Time of Departure from the " ED  Results for orders placed or performed during the hospital encounter of 06/21/23 (from the past 24 hour(s))   CBC with platelets differential    Narrative    The following orders were created for panel order CBC with platelets differential.  Procedure                               Abnormality         Status                     ---------                               -----------         ------                     CBC with platelets and d...[[id number]]  Abnormal            Final result                 Please view results for these tests on the individual orders.   Comprehensive metabolic panel   Result Value Ref Range    Sodium 133 (L) 136 - 145 mmol/L    Potassium 3.5 3.4 - 5.3 mmol/L    Chloride 99 98 - 107 mmol/L    Carbon Dioxide (CO2) 26 22 - 29 mmol/L    Anion Gap 8 7 - 15 mmol/L    Urea Nitrogen 10.6 8.0 - 23.0 mg/dL    Creatinine 0.70 0.67 - 1.17 mg/dL    Calcium 8.2 (L) 8.8 - 10.2 mg/dL    Glucose 103 (H) 70 - 99 mg/dL    Alkaline Phosphatase 193 (H) 40 - 129 U/L    AST 14 0 - 45 U/L    ALT 9 0 - 70 U/L    Protein Total 5.6 (L) 6.4 - 8.3 g/dL    Albumin 2.2 (L) 3.5 - 5.2 g/dL    Bilirubin Total 0.2 <=1.2 mg/dL    GFR Estimate >90 >60 mL/min/1.73m2   TSH with free T4 reflex   Result Value Ref Range    TSH 1.81 0.30 - 4.20 uIU/mL   Lipase   Result Value Ref Range    Lipase 24 13 - 60 U/L   Blood gas venous   Result Value Ref Range    pH Venous 7.42 7.32 - 7.43    pCO2 Venous 48 40 - 50 mm Hg    pO2 Venous 20 (L) 25 - 47 mm Hg    Bicarbonate Venous 31 (H) 21 - 28 mmol/L    Base Excess/Deficit (+/-) 5.8 (H) -7.7 - 1.9 mmol/L    FIO2 0    CBC with platelets and differential   Result Value Ref Range    WBC Count 54.9 (HH) 4.0 - 11.0 10e3/uL    RBC Count 2.46 (L) 4.40 - 5.90 10e6/uL    Hemoglobin 8.4 (L) 13.3 - 17.7 g/dL    Hematocrit 25.7 (L) 40.0 - 53.0 %     (H) 78 - 100 fL    MCH 34.1 (H) 26.5 - 33.0 pg    MCHC 32.7 31.5 - 36.5 g/dL    RDW 13.3 10.0 - 15.0 %    Platelet Count 559 (H) 150 - 450 10e3/uL     % Neutrophils 90 %    % Lymphocytes 4 %    % Monocytes 3 %    % Eosinophils 0 %    % Basophils 0 %    % Immature Granulocytes 3 %    NRBCs per 100 WBC 0 <1 /100    Absolute Neutrophils 49.0 (H) 1.6 - 8.3 10e3/uL    Absolute Lymphocytes 2.2 0.8 - 5.3 10e3/uL    Absolute Monocytes 1.7 (H) 0.0 - 1.3 10e3/uL    Absolute Eosinophils 0.1 0.0 - 0.7 10e3/uL    Absolute Basophils 0.2 0.0 - 0.2 10e3/uL    Absolute Immature Granulocytes 1.7 (H) <=0.4 10e3/uL    Absolute NRBCs 0.0 10e3/uL   [place]oth Draw    Narrative    The following orders were created for panel order [place] Draw.  Procedure                               Abnormality         Status                     ---------                               -----------         ------                     Extra Blue Top Tube[[id number]]                              Final result                 Please view results for these tests on the individual orders.   Extra Blue Top Tube   Result Value Ref Range    Hold Specimen JI    CT Chest (PE) Abdomen Pelvis w Contrast    Narrative    EXAM: CT CHEST PE ABDOMEN PELVIS W CONTRAST  LOCATION: [facility]  DATE/TIME: 6/21/2023 9:01 PM CDT    INDICATION: profound weight loss >25 pounds, severe dyspnea on exertion  COMPARISON: Chest CT 01/12/2023.  TECHNIQUE: CT angiogram chest during arterial phase injection IV contrast. 2D and 3D MIP reconstructions were performed by the CT technologist. Dose reduction techniques were used.     TECHNIQUE: CT scan of the abdomen and pelvis was performed following injection of IV contrast. Multiplanar reformats were obtained. Dose reduction techniques were used.    CONTRAST: 60 mL Isovue 370      CTA CHEST FINDINGS:  ANGIOGRAM CHEST: No evidence for pulmonary embolism. Pulmonary arteries normal in caliber. Thoracic aorta normal in caliber. No aortic dissection or other acute abnormality.    HEART: Cardiac chambers within normal limits. Trace pericardial effusion. Moderate coronary  arterial calcification.    LUNGS AND PLEURA: Mild upper zone emphysema. No pulmonary mass or consolidation. There is mild bronchial wall thickening and mucous plugging, greatest in the right lower lobe. There are multiple noncalcified pulmonary nodules in the lungs bilaterally,   largest in the left lung appears new left upper lobe and measures 1.3 cm; largest in the right lung measures 1.0 cm in the right lower lobe. Small bilateral pleural effusions layer dependently, left greater than right.    MEDIASTINUM: No abnormally enlarged mediastinal or hilar lymph nodes. There are abnormally enlarged retrocrural lymph nodes and shotty periaortic lymph nodes, and largest measuring 12 mm.    MUSCULOSKELETAL: No suspicious bone lesion.      CT ABDOMEN PELVIS FINDINGS:  HEPATOBILIARY: There are multiple ill-defined low-density lesions in the liver, for example measuring 1.5 cm area the diaphragmatic dome on axial 53, and more conspicuously in the right hepatic lobe inferiorly measuring 1.8 cm on axial 82. There are   additional subtle smaller low-density lesions scattered throughout the liver. No gallstones.    PANCREAS: Normal.    SPLEEN: Normal.    ADRENAL GLANDS: Normal.    KIDNEYS/BLADDER: No renal mass or hydronephrosis. No urinary tract calcification. There is circumferential bladder wall thickening..    BOWEL: Small volume ascites. No bowel obstruction. Normal caliber appendix. No abnormal bowel wall enhancement or thickening.    LYMPH NODES: There are numerous abnormally enlarged upper mesenteric lymph nodes. There is bulky retroperitoneal adenopathy, many nodes showing subtle low-density centrally suggesting necrosis. There is more extensive bulky external iliac chain   adenopathy, the largest kaela conglomerate encasing the left external iliac vessels, measuring 4.5 x 4.5 x 6.5 cm. There is a large left inguinal node measuring approximately 2 cm. Bulky adenopathy within the lower retroperitoneum encases the  confluence   of the common iliac veins and lower IVC which appear severely narrowed but opacified. The upper IVC is normally opacified. No evidence for deep pelvic venous or caval thrombus.    VASCULATURE: Unremarkable.    PELVIC ORGANS: There is prominent edema and congestion throughout the pelvis, around the urinary bladder and rectum, likely related to the necrotic appearing adenopathy..    MUSCULOSKELETAL: No suspicious bone lesion. Severe multilevel degenerative change throughout the lumbar spine.        Impression    IMPRESSION:  1.  No evidence for pulmonary embolism.  2.  Multiple pulmonary nodules, liver lesions, and extensive lymph node enlargement in the lower chest, and throughout the abdomen and pelvis. Findings are compatible with metastatic disease, however, primary lesion is not identified. The largest nodule   in the left upper lobe may represent the primary lesion although there are numerous other similar appearing pulmonary nodules. If tissue diagnosis is needed, there is a prominent left inguinal lymph node that would be amenable to percutaneous biopsy with   ultrasound guidance.  3.  Severe mass effect upon the iliac veins and lower IVC secondary to enlarged lymph nodes, however, no evidence for thrombus.  4.  Circumferential bladder wall thickening.       For the majority of the shift this patient's behavior was Green     Cardiac Rhythm: N/A  Pt needs tele? No  Skin/wound Issues: None    Code Status: DNR / DNI    Pain control: good    Nausea control: good    Abnormal labs/tests/findings requiring intervention: WBC low    Patient tested for COVID 19 prior to admission: NO     OBS brochure/video discussed/provided to patient/family: N/A     Family present during ED course? Yes     Family Comments/Social Situation comments: Family involved with care    Tasks needing completion:  Continued IVF    Karla Christine RN

## 2023-06-23 NOTE — PROGRESS NOTES
Care Management Follow Up    Length of Stay (days): 0    Expected Discharge Date: 06/24/2023     Concerns to be Addressed: discharge planning     Patient plan of care discussed at interdisciplinary rounds: Yes    Anticipated Discharge Disposition: Home Care     Anticipated Discharge Services: Other (see comment) (Referral made to [facility] for LTC supports)  Anticipated Discharge DME: None    Patient/family educated on Medicare website which has current facility and service quality ratings: yes  Education Provided on the Discharge Plan: Yes  Patient/Family in Agreement with the Plan: yes    Referrals Placed by CM/SW:    Private pay costs discussed: Not applicable    Additional Information:  [id number]  Referral was sent to [facility] for community resources.  Chaparrita Ibarra RN   Inpatient Care Coordinator  [facility] [phone]  [facility] [phone]      Chaparrita Ibarra RN

## 2023-06-23 NOTE — PROGRESS NOTES
Reason for Admission: Generalized Weakness       Neuro: WNL A/O x 4 Forgetful per his report.    GI/: Incontinent of bladder. Primofit in place d/t sacral sore and incontinence. 200 ml output. Awaiting urine culture result.    Resp: WNL    CV: WNL    Skin: Sore on coccyx blanchable redness - covered with mepilex for protection.CDI    Diet:Regular Diet     Activity: A1 + walker and gait belt.    Lines/Drains: PIV NS @125/Hr    Vitals:Temp: 98.6  F (37  C) Temp src: Axillary BP: 103/58 Pulse: 87   Resp: 16 SpO2: 97 % O2 Device: None (Room air)      Pain: Denies       Plan: Lung biopsy, ultrasound tomorrow - continue plan of care.

## 2023-06-23 NOTE — PROGRESS NOTES
[facility] DISCHARGE NOTE    Patient discharged to home at 2:10 PM via wheel chair. Accompanied by daughter and staff. Discharge instructions reviewed with patient and daughter, opportunity offered to ask questions. Prescriptions sent to patients preferred pharmacy. All belongings sent with patient.    Micaela Faith RN

## 2023-06-23 NOTE — PROGRESS NOTES
New Patient Oncology Nurse Navigator Note     Referring provider: Dr. Julio Cesar Heath    Referring Clinic/Organization: [facility]  Referred to: Medical Oncology  Requested provider (if applicable): First available - did not specify   Referral Received: 06/23/23       Evaluation for :   Diagnosis   C79.9 (ICD-10-CM) - Metastatic malignant neoplasm, unspecified site (H)     My Clinical Question Is: Follow up biopsy for probable malignancy     Additional Information: Biopsy done on 6/23/23; pathology pending     Clinical History (per Nurse review of records provided):      06/21/2023 CT Chest PE Abdomen Pelvis w/ contrast (bookmarked) showed:   IMPRESSION:  1.  No evidence for pulmonary embolism.  2.  Multiple pulmonary nodules, liver lesions, and extensive lymph node enlargement in the lower chest, and throughout the abdomen and pelvis. Findings are compatible with metastatic disease, however, primary lesion is not identified. The largest nodule   in the left upper lobe may represent the primary lesion although there are numerous other similar appearing pulmonary nodules. If tissue diagnosis is needed, there is a prominent left inguinal lymph node that would be amenable to percutaneous biopsy with   ultrasound guidance.  3.  Severe mass effect upon the iliac veins and lower IVC secondary to enlarged lymph nodes, however, no evidence for thrombus.  4.  Circumferential bladder wall thickening.    06/23/2023 Biopsy of left inguinal lymph node (pathology pending as of 6/23)    Clinical Assessment / Barriers to Care (Per Nurse):   Tobacco History    Smoking Status   Every Day Smoking Frequency   1.00 packs/day Smoking Tobacco Type   Cigarettes     Records Location: [facility]     Records Needed: Outside CT images from [place]  Referral updates and Plan:   Writer called Julissa to discuss the referral. He requests that writer called his eldest daughter, Mimi to schedule. Writer called Mimi and there was no answer. Left a  detailed message to return call to [employer].     Mimi called writer back. She agreed to schedule with Dr. Calderon in [place] for next available. She is aware biopsy results are still pending. All questions answered. Clinic address and phone number given to Mimi.     Addendum: Per chart review, pt's appt with Dr. Calderon was canceled by financial counselors due to pt's insurance being [employer]. Per chart review, pt is now schedule with Oncology at [facility] on 7/6.     MALDONADO SimmonsN, RN, OCN  [facility] Oncology Nurse Navigator  [phone] / [phone]

## 2023-06-23 NOTE — DISCHARGE SUMMARY
[facility]  Hospitalist Discharge Summary       Date of Admission:  6/21/2023  Date of Discharge:  6/23/2023  Discharging Provider: Juli oCesar Heath MD      Discharge Diagnoses     Generalized weakness  Lymphadenopathy concerning for metastatic malignancy  Weight loss  Leukocytosis  Thrombocytosis  Macrocytic anemia  Folate deficiency  Anemia of chronic disease  Alcohol abuse  Tobacco abuse  COPD  Depression    Follow-ups Needed After Discharge   Follow-up Appointments     Follow-up and recommended labs and tests       Follow up with primary care provider, Steven Duane Semmler, within 7 days   for hospital follow- up.  The following labs/tests are recommended: CMP   and CBC.          Unresulted Labs Ordered in the Past 30 Days of this Admission     Date and Time Order Name Status Description    6/23/2023  9:49 AM Surgical Pathology Exam In process     6/22/2023  5:55 PM Urine Culture Preliminary       These results will be followed up by Julio Cesar Heath or PCP    Discharge Disposition   Discharged to home  Condition at discharge: Poor    Hospital Course   Julissa Almazan is a 73 year old male who was admitted on 6/21/2023 with dyspnea on exertion, weakness, and 25 pound weight loss over the past 3 months.    Generalized weakness  Patient lives by himself without family nearby.  -Consult PT and OT    Lymphadenopathy concerning for metastatic malignancy  CT chest and pelvis with contrast is negative for pulmonary embolism.  There are multiple pulmonary nodules, liver lesions, and extensive lymph node enlargement in the lower chest and throughout the abdomen and pelvis.  Largest node in the left upper lobe may represent primary lesion.  There is a prominent left inguinal lymph node amenable to percutaneous biopsy with ultrasound.  There is severe mass effect on the iliac veins and lower IVC secondary to enlarged lymph node but no evidence for thrombus.  There is circumferential bladder wall  thickening.  -Patient underwent an ultrasound-guided core biopsy of the left inguinal node on 6/23.  -Pathology pending at time of discharge  -Patient referred to hematology oncology clinic for pathology follow-up  -Patient discharged home with home health care    Weight loss  Patient reports 25 pound weight loss in the past 3 months.  -Nutrition consult    Leukocytosis  Thrombocytosis  WBC 54.9, platelets 559.  -Follow CBC    Macrocytic anemia  Folate deficiency  Anemia of chronic disease  Hemoglobin 8.4 on admission.  No evidence of acute bleeding.  -Serum iron 22, iron binding capacity 121, iron saturation 18%, ferritin 84  -Folate less than 2.0, start folate 1 mg by mouth daily  -B12 level normal at 589    Alcohol abuse  Previously had been drinking 6 beers per day, cut back to 3 beers per day for the last few weeks.  No history of withdrawal.  -No evidence of alcohol withdrawal during hospitalization    Tobacco abuse  Patient is cut back to 1/4 pack/day and last few weeks.  -He has declined nicotine patch  -Encourage smoking cessation    COPD  No evidence of acute exacerbation  -Continue PTA Spiriva and albuterol as needed    Depression  -Continue PTA venlafaxine    Consultations This Hospital Stay   NUTRITION SERVICES ADULT IP CONSULT  PHYSICAL THERAPY ADULT IP CONSULT  OCCUPATIONAL THERAPY ADULT IP CONSULT    Code Status   Full Code    Time Spent on this Encounter   I, Julio Cesar Heath MD, personally saw the patient today and spent greater than 30 minutes discharging this patient.       Julio Cesar Heath MD  [facility]  ______________________________________________________________________    Physical Exam   Vital Signs: Temp: 98.4  F (36.9  C) Temp src: Oral BP: 112/59 Pulse: 87   Resp: 16 SpO2: 96 % O2 Device: None (Room air)    Weight: 128 lbs 8.45 oz       Gen: Cachectic elderly male, alert and oriented x 3, no acute distressed  HEENT: Atraumatic, bitemporal wasting;  sclera non-injected, anicterric; oral mucosa moist, no lesion, no exudate  Lungs: Clear to ausculation, no wheezes, no rhonchi, no rales  Heart: Regular rate, regular rhythm, no gallops, no rubs, no murmurs  GI: Bowel sound normal, no hepatosplenomegaly, no masses, non-tender, non-distended, no guarding, no rebound tenderness  Lymph: No lymphadenopathy, no edema  Skin: No rashes, no chronic venous stasis      Primary Care Physician   Steven Duane Semmler    Discharge Orders      Home Care Referral      Adult Oncology/Hematology  Referral      Reason for your hospital stay    This is a 73 year old male admitted with weight loss and failure to thrive at home.     Follow-up and recommended labs and tests     Follow up with primary care provider, Steven Duane Semmler, within 7 days for hospital follow- up.  The following labs/tests are recommended: CMP and CBC.     Activity    Your activity upon discharge: activity as tolerated     Full Code     Diet    Follow this diet upon discharge: Orders Placed This Encounter      Regular Diet Adult         Significant Results and Procedures   Most Recent 3 CBC's:Recent Labs   Lab Test 06/23/23  0516 06/22/23 0621 06/21/23 1929   WBC 52.4* 50.7* 54.9*   HGB 8.2* 8.0* 8.4*   * 105* 105*   * 563* 559*     Most Recent 3 BMP's:Recent Labs   Lab Test 06/23/23  0516 06/22/23  2155 06/22/23  1457 06/22/23  0621 06/21/23 1929   *  --   --  132* 133*   POTASSIUM 3.5 3.6 3.4 3.1* 3.5   CHLORIDE 101  --   --  99 99   CO2 24  --   --  25 26   BUN 6.6*  --   --  8.8 10.6   CR 0.64*  --   --  0.64* 0.70   ANIONGAP 8  --   --  8 8   MAGUE 7.3*  --   --  7.5* 8.2*   GLC 62*  --   --  81 103*     Most Recent 2 LFT's:Recent Labs   Lab Test 06/23/23  0516 06/21/23 1929   AST 14 14   ALT 9 9   ALKPHOS 291* 193*   BILITOTAL 0.2 0.2   ,   Results for orders placed or performed during the hospital encounter of 06/21/23   CT Chest (PE) Abdomen Pelvis w Contrast    Narrative     EXAM: CT CHEST PE ABDOMEN PELVIS W CONTRAST  LOCATION: [facility]  DATE/TIME: 6/21/2023 9:01 PM CDT    INDICATION: profound weight loss >25 pounds, severe dyspnea on exertion  COMPARISON: Chest CT 01/12/2023.  TECHNIQUE: CT angiogram chest during arterial phase injection IV contrast. 2D and 3D MIP reconstructions were performed by the CT technologist. Dose reduction techniques were used.     TECHNIQUE: CT scan of the abdomen and pelvis was performed following injection of IV contrast. Multiplanar reformats were obtained. Dose reduction techniques were used.    CONTRAST: 60 mL Isovue 370      CTA CHEST FINDINGS:  ANGIOGRAM CHEST: No evidence for pulmonary embolism. Pulmonary arteries normal in caliber. Thoracic aorta normal in caliber. No aortic dissection or other acute abnormality.    HEART: Cardiac chambers within normal limits. Trace pericardial effusion. Moderate coronary arterial calcification.    LUNGS AND PLEURA: Mild upper zone emphysema. No pulmonary mass or consolidation. There is mild bronchial wall thickening and mucous plugging, greatest in the right lower lobe. There are multiple noncalcified pulmonary nodules in the lungs bilaterally,   largest in the left lung appears new left upper lobe and measures 1.3 cm; largest in the right lung measures 1.0 cm in the right lower lobe. Small bilateral pleural effusions layer dependently, left greater than right.    MEDIASTINUM: No abnormally enlarged mediastinal or hilar lymph nodes. There are abnormally enlarged retrocrural lymph nodes and shotty periaortic lymph nodes, and largest measuring 12 mm.    MUSCULOSKELETAL: No suspicious bone lesion.      CT ABDOMEN PELVIS FINDINGS:  HEPATOBILIARY: There are multiple ill-defined low-density lesions in the liver, for example measuring 1.5 cm area the diaphragmatic dome on axial 53, and more conspicuously in the right hepatic lobe inferiorly measuring 1.8 cm on axial 82. There are    additional subtle smaller low-density lesions scattered throughout the liver. No gallstones.    PANCREAS: Normal.    SPLEEN: Normal.    ADRENAL GLANDS: Normal.    KIDNEYS/BLADDER: No renal mass or hydronephrosis. No urinary tract calcification. There is circumferential bladder wall thickening..    BOWEL: Small volume ascites. No bowel obstruction. Normal caliber appendix. No abnormal bowel wall enhancement or thickening.    LYMPH NODES: There are numerous abnormally enlarged upper mesenteric lymph nodes. There is bulky retroperitoneal adenopathy, many nodes showing subtle low-density centrally suggesting necrosis. There is more extensive bulky external iliac chain   adenopathy, the largest kaela conglomerate encasing the left external iliac vessels, measuring 4.5 x 4.5 x 6.5 cm. There is a large left inguinal node measuring approximately 2 cm. Bulky adenopathy within the lower retroperitoneum encases the confluence   of the common iliac veins and lower IVC which appear severely narrowed but opacified. The upper IVC is normally opacified. No evidence for deep pelvic venous or caval thrombus.    VASCULATURE: Unremarkable.    PELVIC ORGANS: There is prominent edema and congestion throughout the pelvis, around the urinary bladder and rectum, likely related to the necrotic appearing adenopathy..    MUSCULOSKELETAL: No suspicious bone lesion. Severe multilevel degenerative change throughout the lumbar spine.        Impression    IMPRESSION:  1.  No evidence for pulmonary embolism.  2.  Multiple pulmonary nodules, liver lesions, and extensive lymph node enlargement in the lower chest, and throughout the abdomen and pelvis. Findings are compatible with metastatic disease, however, primary lesion is not identified. The largest nodule   in the left upper lobe may represent the primary lesion although there are numerous other similar appearing pulmonary nodules. If tissue diagnosis is needed, there is a prominent left  inguinal lymph node that would be amenable to percutaneous biopsy with   ultrasound guidance.  3.  Severe mass effect upon the iliac veins and lower IVC secondary to enlarged lymph nodes, however, no evidence for thrombus.  4.  Circumferential bladder wall thickening.   US Biopsy FNA Lymph Node    Narrative    Ultrasound-guided core biopsy left inguinal lymph node    COMPARISON: CT June 21, 2023    Clinical indication: Metastatic disease    Findings/technique: Procedure was discussed with the patient including  benefits and risks which included but were not limited to bleeding,  infection and damage to adjacent structures. Patient wished to proceed  and gave verbal consent and signed written informed consent. Patient  was supine on the imaging table. A timeout was performed according to  universal protocol. Overlying soft tissues were prepped and draped in  usual sterile fashion. All amount of 1% lidocaine was used for local  anesthesia.    Under direct ultrasound guidance, a 17-gauge introducer was advanced  just proximal to an enlarged left inguinal node. Subsequently, 4  18-gauge core biopsy samples were obtained and placed into formalin.  All instrumentation was removed. No immediate post procedural  complications. Patient tolerated the procedure well if the department  stable condition.      Impression    IMPRESSION:  1. Technically successful core biopsy of left inguinal  lymphadenopathy. Pathology pending.    DELPHINE WEST MD         SYSTEM ID:  [id number]       Discharge Medications   Current Discharge Medication List      START taking these medications    Details   folic acid (FOLVITE) 1 MG tablet Take 1 tablet (1 mg) by mouth daily  Qty: 30 tablet, Refills: 0    Associated Diagnoses: Metastatic malignant neoplasm, unspecified site (H); Failure to thrive in adult         CONTINUE these medications which have NOT CHANGED    Details   aspirin (ASA) 81 MG tablet Take 81 mg by mouth daily      sildenafil  (VIAGRA) 100 MG tablet Take 1 tablet by mouth daily as needed.      tiotropium (SPIRIVA) 18 MCG inhaled capsule Inhale 18 mcg into the lungs daily      venlafaxine (EFFEXOR) 100 MG tablet Take 200 mg by mouth every morning           Allergies   No Known Allergies

## 2023-06-23 NOTE — CONSULTS
Care Management Initial Consult    General Information  Assessment completed with: Patient, Les  Type of CM/SW Visit: Initial Assessment    Primary Care Provider verified and updated as needed: Yes   Readmission within the last 30 days: no previous admission in last 30 days      Reason for Consult: discharge planning  Advance Care Planning:          Communication Assessment  Patient's communication style: spoken language (English or Bilingual)    Hearing Difficulty or Deaf: no   Wear Glasses or Blind: yes    Cognitive  Cognitive/Neuro/Behavioral: WDL                      Living Environment:   People in home: alone     Current living Arrangements: house      Able to return to prior arrangements: yes  Living Arrangement Comments: Pt has a supportive neighbor who makes meals for him in exchange for funds & the use of his vehicle.    Family/Social Support:  Care provided by: self  Provides care for: no one  Marital Status:   Children, Neighbor          Description of Support System: Supportive    Support Assessment: Limited social contact and support, Minimal outside structure and leisure time activities    Current Resources:   Patient receiving home care services: No  Community Resources: None  Equipment currently used at home: none  Supplies currently used at home: None    Employment/Financial:  Employment Status: retired        Financial Concerns: No concerns identified   Referral to Financial Worker: No     Does the patient's insurance plan have a 3 day qualifying hospital stay waiver?  No    Lifestyle & Psychosocial Needs:  Social Determinants of Health     Tobacco Use: High Risk (8/17/2020)    Patient History      Smoking Tobacco Use: Every Day      Smokeless Tobacco Use: Unknown      Passive Exposure: Not on file   Alcohol Use: Not on file   Financial Resource Strain: Not on file   Food Insecurity: Not on file   Transportation Needs: Not on file   Physical Activity: Not on file   Stress: Not on file  "  Social Connections: Not on file   Intimate Partner Violence: Not on file   Depression: Not on file   Housing Stability: Not on file     Functional Status:  Prior to admission patient needed assistance:   Dependent ADLs:: Independent  Dependent IADLs:: Transportation, Cooking     Mental Health Status:  Mental Health Status: No Current Concerns       Chemical Dependency Status:  Chemical Dependency Status:  ETOH use daily, has reduced intake over past several weeks.        Values/Beliefs:  Spiritual, Cultural Beliefs, Sikhism Practices, Values that affect care: no       Additional Information:  Care Management received referral for discharge planning.    TESS reviewed EMR, attended IDT rounds, spoke with medical care team, and then met pt at bedside to introduce self/role, perform assessment, and discuss resources.    Per pt, prior to hospitalization, pt lives in a private home & reports being independent with all I/ADLS, but also shared that his neighbor cooks for him in return for using his vehicle and cash for groceries.  Pt shared he ambulates independently, but has been \"not as strong lately.\"     Therapy team currently recommending home care services & community based assistance.  Pt shared he would welcome home care services.    TESS discussed discharge planning, and provided education regarding Medicare.gov; how pt's insurance will cover services upon discharge, Medicare's homebound status in relation to Home Care services, and offered choice of agency for cares.  Per discussion with pt he is in agreement.  TESS sent referral to [facility].    Pt shared his daughter will transport home as she is staying at his home until Sunday.      Care Management Discharge Note    Discharge Date: 06/24/2023    Discharge Disposition: Home Care    Discharge Services: Referral made to [facility] for LTC supports    Discharge DME: None    Discharge Transportation: family or friend will provide    Private pay costs " discussed: Not applicable    Does the patient's insurance plan have a 3 day qualifying hospital stay waiver?  No    PAS Confirmation Code:  NA  Patient/family educated on Medicare website which has current facility and service quality ratings: yes    Education Provided on the Discharge Plan: Yes  Persons Notified of Discharge Plans: Patient  Patient/Family in Agreement with the Plan: yes    Handoff Referral Completed: Yes    Additional Information:  Per IDT rounds, pt is medically stable for discharge once a home care agency is secured.      PT/OT recommend home with PT/OT, HHA & SW.  Pt is in agreement & we are waiting for [facility] to secure an agency in pt's insurance network.    Addendum:   [facility],  (P: [phone]/ F: [phone]) has accepted pt for home care services.    CM to place senior linkage line referral for community based needs for long term care resources.    SATYA Estevez

## 2023-06-23 NOTE — PLAN OF CARE
Occupational Therapy Discharge Summary    Reason for therapy discharge:    Discharged to home with home therapy.    Progress towards therapy goal(s). See goals on Care Plan in [facility] electronic health record for goal details.  Goals partially met.  Barriers to achieving goals:   discharge from facility.    Therapy recommendation(s):    Continued therapy is recommended.  Rationale/Recommendations:  Recommend continued OT with home care OT to improve I/ADL independence and safety in home environment.

## 2023-06-23 NOTE — PLAN OF CARE
Patient had biopsy this am. Tolerated well. Denies pain. Has bandaide on left groin area that is intact with no bleeding. Vitals are stable. Daughters here this am. Pleasant and cooperative. Did share with nurse that his 2 daughters have cancer. Allowed him to vent his feelings. Removed male external catheter as bed soaked with urine this am. He says he has some dribbling of urine at baseline, but uses bathroom at home.Is able to walk to bathroom with assist of 1, walker and gait belt and voided this am. Has incontinent brief on, urinal at bedside and reminded to call when he needs to use the bathroom. Mipalex sacral dressing intact. Has extremely dry skin overall. CIWA 0.

## 2023-06-25 NOTE — PROGRESS NOTES
Clinic Care Coordination Contact  [facility]: Post-Discharge Note  SITUATION                                                      Admission:    Admission Date: 06/21/23   Reason for Admission: dyspnea on exertion, weakness  Discharge:   Discharge Date: 06/23/23  Discharge Diagnosis: Generalized weakness  Lymphadenopathy concerning for metastatic malignancy  Weight loss  Leukocytosis  Thrombocytosis  Macrocytic anemia  Folate deficiency  Anemia of chronic disease  Alcohol abuse  Tobacco abuse  COPD  Depression    BACKGROUND                                                      Per hospital discharge summary and inpatient provider notes:    Hospital Course  Julissa Almazan is a 73 year old male who was admitted on 6/21/2023 with dyspnea on exertion, weakness, and 25 pound weight loss over the past 3 months    ASSESSMENT           Discharge Assessment  How are you doing now that you are home?: I'm still having some shortness of breath with walking around but it's better  How are your symptoms? (Red Flag symptoms escalate to triage hotline per guidelines): Improved  Do you feel your condition is stable enough to be safe at home until your provider visit?: Yes  Does the patient have their discharge instructions? : Yes  Does the patient have questions regarding their discharge instructions? : No  Were you started on any new medications or were there changes to any of your previous medications? : Yes  Does the patient have all of their medications?: Yes  Do you have questions regarding any of your medications? : No  Discharge follow-up appointment scheduled within 14 calendar days? : No  Is patient agreeable to assistance with scheduling? : No (Patient is waiting to hear from hem/onc  as well as home care)         Post-op (Clinicians Only)  Did the patient have surgery or a procedure: Yes (ultrasound guided core biopsy of L inguinal nodule)    Patient resting. Children are taking turns coming over to assist.  Advised to schedule with PCP for appointment. Patient anxiously waiting for biopsy results. Advised that hem/one , as well as home care will be calling soon. 24/7 [facility] nurse triage phone number provided to patient.       PLAN                                                      Outpatient Plan:  Follow up with primary care provider, Steven Duane Semmler, within 7 days   for hospital follow- up.  The following labs/tests are recommended: CMP   and CBC.      No future appointments.      For any urgent concerns, please contact our 24 hour nurse triage line: [phone] ([phone])         Luz Burgos RN

## 2023-06-26 NOTE — TELEPHONE ENCOUNTER
RECORDS STATUS - ALL OTHER DIAGNOSIS    - Metastatic malignant neoplasm, unspecified site (H)  RECORDS RECEIVED FROM: [facility]/ [facility] and [facility] Radiology    Appt Date: TBD NN WQ     Please obtain outside CT Chest images from [facility] from the last 5 years  Action    Action Taken 6/26/2023 9:05AM KEB   I called [facility] Dept [phone] - some of the CT chest scans were done at [facility] and [facility].     I called [facility] Ph: [phone]- they have a CT Chest from 1/12/2023, 1/10/2022- they will fax the reports over too!     I called [facility] Phone: [phone] #2- They are part of [facility].     6/29/2023 2:53pm NURA   I resolved imaging from [facility] in [facility] PACS      NOTES STATUS DETAILS   OFFICE NOTE from referring provider Complete  Julio Cesar Heath MD   DISCHARGE SUMMARY from hospital     DISCHARGE REPORT from the ER     OPERATIVE REPORT     MEDICATION LIST Complete [facility]   CLINICAL TRIAL TREATMENTS TO DATE     LABS     PATHOLOGY REPORTS  In Process    ANYTHING RELATED TO DIAGNOSIS     GENONOMIC TESTING     TYPE:     IMAGING (NEED IMAGES & REPORT)     CT SCANS Complete  CT Chest Abdomen Pelvis 6/21/2023    CT Chest Lung 1/12/2023    CT Chest 1/10/2022   MRI     MAMMO     ULTRASOUND     PET

## 2023-12-14 NOTE — PROGRESS NOTES
Occupational Therapy     06/22/23 1116   Appointment Info   Signing Clinician's Name / Credentials (OT) Lauryn Frye, OTR/L   Quick Adds   Quick Adds Certification   Living Environment   People in Home alone   Current Living Arrangements house   Home Accessibility stairs to enter home;stairs within home   Number of Stairs, Main Entrance 2   Stair Railings, Main Entrance none   Number of Stairs, Within Home, Primary greater than 10 stairs  (To basement - laundry)   Stair Railings, Within Home, Primary railing on left side (ascending)   Self-Care   Equipment Currently Used at Home none   Fall history within last six months yes   Number of times patient has fallen within last six months 4   Activity/Exercise/Self-Care Comment Pt reports indep with ADLs, does not use walking device at baseline.   Instrumental Activities of Daily Living (IADL)   Previous Responsibilities medication management   IADL Comments Pt's neighbors assist with cooking, meals every night. Pt reports hasn't been driving for a while.   General Information   Onset of Illness/Injury or Date of Surgery 06/21/23   Referring Physician Julio Cesar Heath MD   Patient/Family Therapy Goal Statement (OT) To return home   Additional Occupational Profile Info/Pertinent History of Current Problem Per H&P: 73yoM with MDD, COPD, tobacco abuse and alcohol abuse who presented to the ED with DENSON, weakness, unintentional weight loss and fatigue over the last 3 months.  He has lost about 25 pounds.  He has a poor appetite.  He says he came in because his family convinced him to do so.   Existing Precautions/Restrictions fall   Cognitive Status Examination   Orientation Status orientation to person, place and time   Cognitive Status Comments Pt A&O x4, able to follow 2 step directions, conversation and communicate needs.   Range of Motion Comprehensive   General Range of Motion bilateral upper extremity ROM WFL   Strength Comprehensive (MMT)   General Manual Muscle  Testing (MMT) Assessment no strength deficits identified   Bed Mobility   Bed Mobility supine-sit   Supine-Sit [place] (Bed Mobility) supervision   Comment (Bed Mobility) Pt completes supine>sit to preferred L side of bed SBA HOB elevated. Pt able to sit EOB unsupported SBA.   Transfers   Transfers sit-stand transfer   Sit-Stand Transfer   Sit-Stand [place] (Transfers) supervision   Assistive Device (Sit-Stand Transfers) walker, front-wheeled   Sit/Stand Transfer Comments Pt completes sit>stand EOB to FWW SBA, sturdy upon standing with no reports of dizziness/lightheadedness.   Clinical Impression   Criteria for Skilled Therapeutic Interventions Met (OT) Yes, treatment indicated   OT Diagnosis Decreased ADL indep/safety   Influenced by the following impairments generalized weakness, failure to thrive in adult, dehydration, metastatic malignant neoplasm, unspecified site   OT Problem List-Impairments impacting ADL problems related to;activity tolerance impaired;strength   Assessment of Occupational Performance 1-3 Performance Deficits   Identified Performance Deficits transfers, functional mobility, standing tolerance   Planned Therapy Interventions (OT) ADL retraining;strengthening;home program guidelines;progressive activity/exercise   Clinical Decision Making Complexity (OT) low complexity   Risk & Benefits of therapy have been explained evaluation/treatment results reviewed;care plan/treatment goals reviewed;risks/benefits reviewed   OT Total Evaluation Time   OT Eval, Low Complexity Minutes (47334) 10   Therapy Certification   Start of Care Date 06/22/23   Certification date from 06/22/23   Certification date to 06/29/23   Medical Diagnosis generalized weakness, failure to thrive in adult, dehydration, metastatic malignant neoplasm, unspecified site   OT Goals   Therapy Frequency (OT) 5 times/wk   OT Predicted Duration/Target Date for Goal Attainment 06/29/23   OT Goals Hygiene/Grooming;Lower Body  Dressing;Toilet Transfer/Toileting;OT Goal 1   OT: Hygiene/Grooming supervision/stand-by assist;while standing   OT: Lower Body Dressing Supervision/stand-by assist   OT: Toilet Transfer/Toileting Supervision/stand-by assist;toilet transfer;cleaning and garment management   OT: Goal 1 Pt will demonstrate understanding of UB HEP for maintenance of UB functional strength/mobility by discharge.   Therapeutic Activities   Therapeutic Activity Minutes (97481) 10   Symptoms noted during/after treatment fatigue   Treatment Detail/Skilled Intervention Education provided on role of OT while in hospital and throughout POC with pt and DTR verbalizing understanding. Pt dons socks SBA. Pt standing bedside w/ FWW marches in place x6 CGA. Pt ambulates forward/backward ~4 ft w/ FWW CGA reporting increased fatigue with need to sit. O2 levels checked reading 100%. Pt completes sit>supine SBA, MaxA x2 to reposition pt up in bed d/t weakness/fatigue. Pt remains supine in bed with call light and needs in reach, DTR and granddaughter in room.   OT Discharge Planning   OT Plan POC Thurs 1/5: transfers, toileting, standing g/h, UB HEP   OT Discharge Recommendation (DC Rec) Transitional Care Facility   OT Rationale for DC Rec Pt reporting previous indep with ADLs living alone currently deconditioned demonstrating significantly limited activity tolerance. Pt would benefit from TCU to improve strength and act amanda prior to return home. If declines TCU recommend home care OT to assess safety/indep in home environement. Pt appears to have very supportive neighbors who provide meals and assist as needed, family does not live nearby.   OT Brief overview of current status SBA supine<>sit, sit>stand, don/doff socks; CGA ambulating ~4 ft (FWW) - fatigues quickly   Total Session Time   Timed Code Treatment Minutes 10   Total Session Time (sum of timed and untimed services) 20    [facility]  OUTPATIENT OCCUPATIONAL  THERAPY  EVALUATION  PLAN OF TREATMENT FOR OUTPATIENT REHABILITATION  (COMPLETE FOR INITIAL CLAIMS ONLY)  Patient's Last Name, First Name, M.I.  YOB: 1950  Julissa Almazan                          Provider's Name  [facility] Record No.  [id number]                             Onset Date:  06/21/23   Start of Care Date:  06/22/23   Type:     ___PT   _X_OT   ___SLP Medical Diagnosis:  generalized weakness, failure to thrive in adult, dehydration, metastatic malignant neoplasm, unspecified site                    OT Diagnosis:  Decreased ADL indep/safety Visits from SOC:  1     See note for plan of treatment, functional goals and certification details    I CERTIFY THE NEED FOR THESE SERVICES FURNISHED UNDER        THIS PLAN OF TREATMENT AND WHILE UNDER MY CARE     (Physician co-signature of this document indicates review and certification of the therapy plan).                                Possible Home

## (undated) RX ORDER — GLYCOPYRROLATE 0.2 MG/ML
INJECTION, SOLUTION INTRAMUSCULAR; INTRAVENOUS
Status: DISPENSED
Start: 2019-03-21

## (undated) RX ORDER — ONDANSETRON 2 MG/ML
INJECTION INTRAMUSCULAR; INTRAVENOUS
Status: DISPENSED
Start: 2019-03-21